# Patient Record
Sex: FEMALE | Race: WHITE
[De-identification: names, ages, dates, MRNs, and addresses within clinical notes are randomized per-mention and may not be internally consistent; named-entity substitution may affect disease eponyms.]

---

## 2020-03-27 ENCOUNTER — HOSPITAL ENCOUNTER (EMERGENCY)
Dept: HOSPITAL 4 - SED | Age: 80
Discharge: HOME | End: 2020-03-27
Payer: COMMERCIAL

## 2020-03-27 VITALS — SYSTOLIC BLOOD PRESSURE: 129 MMHG

## 2020-03-27 VITALS — WEIGHT: 140 LBS | BODY MASS INDEX: 23.32 KG/M2 | HEIGHT: 65 IN

## 2020-03-27 VITALS — SYSTOLIC BLOOD PRESSURE: 125 MMHG

## 2020-03-27 DIAGNOSIS — Z79.899: ICD-10-CM

## 2020-03-27 DIAGNOSIS — M25.561: ICD-10-CM

## 2020-03-27 DIAGNOSIS — Z00.00: ICD-10-CM

## 2020-03-27 DIAGNOSIS — M25.562: Primary | ICD-10-CM

## 2020-03-27 LAB
ALBUMIN SERPL BCP-MCNC: 2.8 G/DL (ref 3.4–4.8)
ALT SERPL W P-5'-P-CCNC: 17 U/L (ref 12–78)
ANION GAP SERPL CALCULATED.3IONS-SCNC: 7 MMOL/L (ref 5–15)
APPEARANCE UR: CLEAR
AST SERPL W P-5'-P-CCNC: 13 U/L (ref 10–37)
BASOPHILS # BLD AUTO: 0 K/UL (ref 0–0.2)
BASOPHILS NFR BLD AUTO: 0.4 % (ref 0–2)
BILIRUB SERPL-MCNC: 0.2 MG/DL (ref 0–1)
BILIRUB UR QL STRIP: NEGATIVE
BUN SERPL-MCNC: 19 MG/DL (ref 8–21)
CALCIUM SERPL-MCNC: 8.6 MG/DL (ref 8.4–11)
CHLORIDE SERPL-SCNC: 105 MMOL/L (ref 98–107)
COLOR UR: YELLOW
CREAT SERPL-MCNC: 0.94 MG/DL (ref 0.55–1.3)
EOSINOPHIL # BLD AUTO: 0 K/UL (ref 0–0.4)
EOSINOPHIL NFR BLD AUTO: 0.8 % (ref 0–4)
ERYTHROCYTE [DISTWIDTH] IN BLOOD BY AUTOMATED COUNT: 15.4 % (ref 9–15)
GFR SERPL CREATININE-BSD FRML MDRD: (no result) ML/MIN (ref 90–?)
GLUCOSE SERPL-MCNC: 79 MG/DL (ref 70–99)
GLUCOSE UR STRIP-MCNC: NEGATIVE MG/DL
HCT VFR BLD AUTO: 31.2 % (ref 36–48)
HGB BLD-MCNC: 10.2 G/DL (ref 12–16)
HGB UR QL STRIP: NEGATIVE
INR PPP: 1 (ref 0.8–1.2)
KETONES UR STRIP-MCNC: NEGATIVE MG/DL
LEUKOCYTE ESTERASE UR QL STRIP: NEGATIVE
LYMPHOCYTES # BLD AUTO: 1.3 K/UL (ref 1–5.5)
LYMPHOCYTES NFR BLD AUTO: 22.3 % (ref 20.5–51.5)
MCH RBC QN AUTO: 29 PG (ref 27–31)
MCHC RBC AUTO-ENTMCNC: 33 % (ref 32–36)
MCV RBC AUTO: 87 FL (ref 79–98)
MONOCYTES # BLD MANUAL: 0.5 K/UL (ref 0–1)
MONOCYTES # BLD MANUAL: 9.4 % (ref 1.7–9.3)
NEUTROPHILS # BLD AUTO: 3.9 K/UL (ref 1.8–7.7)
NEUTROPHILS NFR BLD AUTO: 67.1 % (ref 40–70)
NITRITE UR QL STRIP: NEGATIVE
PH UR STRIP: 7 [PH] (ref 5–8)
PLATELET # BLD AUTO: 281 K/UL (ref 130–430)
POTASSIUM SERPL-SCNC: 4.5 MMOL/L (ref 3.5–5.1)
PROT UR QL STRIP: NEGATIVE
PROTHROMBIN TIME: 10.4 SECS (ref 9.5–12.5)
RBC # BLD AUTO: 3.58 MIL/UL (ref 4.2–6.2)
SODIUM SERPLBLD-SCNC: 139 MMOL/L (ref 136–145)
SP GR UR STRIP: 1.01 (ref 1–1.03)
UROBILINOGEN UR STRIP-MCNC: 0.2 MG/DL (ref 0.2–1)
WBC # BLD AUTO: 5.8 K/UL (ref 4.8–10.8)

## 2020-03-27 PROCEDURE — 87040 BLOOD CULTURE FOR BACTERIA: CPT

## 2020-03-27 PROCEDURE — 83605 ASSAY OF LACTIC ACID: CPT

## 2020-03-27 PROCEDURE — 71045 X-RAY EXAM CHEST 1 VIEW: CPT

## 2020-03-27 PROCEDURE — 84484 ASSAY OF TROPONIN QUANT: CPT

## 2020-03-27 PROCEDURE — 81003 URINALYSIS AUTO W/O SCOPE: CPT

## 2020-03-27 PROCEDURE — 85730 THROMBOPLASTIN TIME PARTIAL: CPT

## 2020-03-27 PROCEDURE — 80053 COMPREHEN METABOLIC PANEL: CPT

## 2020-03-27 PROCEDURE — 99285 EMERGENCY DEPT VISIT HI MDM: CPT

## 2020-03-27 PROCEDURE — 85025 COMPLETE CBC W/AUTO DIFF WBC: CPT

## 2020-03-27 PROCEDURE — 87086 URINE CULTURE/COLONY COUNT: CPT

## 2020-03-27 PROCEDURE — 86710 INFLUENZA VIRUS ANTIBODY: CPT

## 2020-03-27 PROCEDURE — 36415 COLL VENOUS BLD VENIPUNCTURE: CPT

## 2020-03-27 PROCEDURE — 85610 PROTHROMBIN TIME: CPT

## 2020-03-27 PROCEDURE — 93005 ELECTROCARDIOGRAM TRACING: CPT

## 2020-05-11 ENCOUNTER — HOSPITAL ENCOUNTER (INPATIENT)
Dept: HOSPITAL 4 - SED | Age: 80
LOS: 7 days | Discharge: NURSING, REHAB OR LONG-TERM CARE | DRG: 603 | End: 2020-05-18
Payer: COMMERCIAL

## 2020-05-11 VITALS — SYSTOLIC BLOOD PRESSURE: 115 MMHG

## 2020-05-11 VITALS — SYSTOLIC BLOOD PRESSURE: 123 MMHG | BODY MASS INDEX: 30.05 KG/M2 | WEIGHT: 153.06 LBS | HEIGHT: 60 IN

## 2020-05-11 VITALS — SYSTOLIC BLOOD PRESSURE: 141 MMHG

## 2020-05-11 DIAGNOSIS — I87.8: ICD-10-CM

## 2020-05-11 DIAGNOSIS — F32.9: ICD-10-CM

## 2020-05-11 DIAGNOSIS — I25.10: ICD-10-CM

## 2020-05-11 DIAGNOSIS — E44.1: ICD-10-CM

## 2020-05-11 DIAGNOSIS — F41.9: ICD-10-CM

## 2020-05-11 DIAGNOSIS — L03.116: Primary | ICD-10-CM

## 2020-05-11 DIAGNOSIS — D64.9: ICD-10-CM

## 2020-05-11 DIAGNOSIS — M19.071: ICD-10-CM

## 2020-05-11 DIAGNOSIS — Z79.899: ICD-10-CM

## 2020-05-11 DIAGNOSIS — I13.0: ICD-10-CM

## 2020-05-11 DIAGNOSIS — K92.2: ICD-10-CM

## 2020-05-11 DIAGNOSIS — Z90.710: ICD-10-CM

## 2020-05-11 DIAGNOSIS — J44.9: ICD-10-CM

## 2020-05-11 DIAGNOSIS — N18.9: ICD-10-CM

## 2020-05-11 DIAGNOSIS — Z20.828: ICD-10-CM

## 2020-05-11 DIAGNOSIS — K21.9: ICD-10-CM

## 2020-05-11 DIAGNOSIS — M19.072: ICD-10-CM

## 2020-05-11 DIAGNOSIS — F03.90: ICD-10-CM

## 2020-05-11 DIAGNOSIS — L03.115: ICD-10-CM

## 2020-05-11 DIAGNOSIS — E78.5: ICD-10-CM

## 2020-05-11 DIAGNOSIS — I50.9: ICD-10-CM

## 2020-05-11 LAB
ALBUMIN SERPL BCP-MCNC: 2.9 G/DL (ref 3.4–4.8)
ALT SERPL W P-5'-P-CCNC: 15 U/L (ref 12–78)
ANION GAP SERPL CALCULATED.3IONS-SCNC: 6 MMOL/L (ref 5–15)
AST SERPL W P-5'-P-CCNC: 17 U/L (ref 10–37)
BASOPHILS # BLD AUTO: 0 K/UL (ref 0–0.2)
BASOPHILS NFR BLD AUTO: 0.4 % (ref 0–2)
BILIRUB SERPL-MCNC: 0.2 MG/DL (ref 0–1)
BUN SERPL-MCNC: 17 MG/DL (ref 8–21)
CALCIUM SERPL-MCNC: 8 MG/DL (ref 8.4–11)
CHLORIDE SERPL-SCNC: 106 MMOL/L (ref 98–107)
CREAT SERPL-MCNC: 0.94 MG/DL (ref 0.55–1.3)
EOSINOPHIL # BLD AUTO: 0.1 K/UL (ref 0–0.4)
EOSINOPHIL NFR BLD AUTO: 1.2 % (ref 0–4)
ERYTHROCYTE [DISTWIDTH] IN BLOOD BY AUTOMATED COUNT: 15 % (ref 9–15)
GFR SERPL CREATININE-BSD FRML MDRD: (no result) ML/MIN (ref 90–?)
GLUCOSE SERPL-MCNC: 100 MG/DL (ref 70–99)
HCT VFR BLD AUTO: 25.9 % (ref 36–48)
HGB BLD-MCNC: 8.6 G/DL (ref 12–16)
LYMPHOCYTES # BLD AUTO: 1.4 K/UL (ref 1–5.5)
LYMPHOCYTES NFR BLD AUTO: 17.2 % (ref 20.5–51.5)
MCH RBC QN AUTO: 29 PG (ref 27–31)
MCHC RBC AUTO-ENTMCNC: 33 % (ref 32–36)
MCV RBC AUTO: 87 FL (ref 79–98)
MONOCYTES # BLD MANUAL: 0.6 K/UL (ref 0–1)
MONOCYTES # BLD MANUAL: 7.4 % (ref 1.7–9.3)
NEUTROPHILS # BLD AUTO: 6.1 K/UL (ref 1.8–7.7)
NEUTROPHILS NFR BLD AUTO: 73.8 % (ref 40–70)
PLATELET # BLD AUTO: 242 K/UL (ref 130–430)
POTASSIUM SERPL-SCNC: 3.9 MMOL/L (ref 3.5–5.1)
RBC # BLD AUTO: 2.99 MIL/UL (ref 4.2–6.2)
SODIUM SERPLBLD-SCNC: 139 MMOL/L (ref 136–145)
WBC # BLD AUTO: 8.3 K/UL (ref 4.8–10.8)

## 2020-05-11 PROCEDURE — U0003 INFECTIOUS AGENT DETECTION BY NUCLEIC ACID (DNA OR RNA); SEVERE ACUTE RESPIRATORY SYNDROME CORONAVIRUS 2 (SARS-COV-2) (CORONAVIRUS DISEASE [COVID-19]), AMPLIFIED PROBE TECHNIQUE, MAKING USE OF HIGH THROUGHPUT TECHNOLOGIES AS DESCRIBED BY CMS-2020-01-R: HCPCS

## 2020-05-11 PROCEDURE — C1751 CATH, INF, PER/CENT/MIDLINE: HCPCS

## 2020-05-11 RX ADMIN — HYDROCODONE BITARTRATE AND ACETAMINOPHEN PRN TAB: 10; 325 TABLET ORAL at 22:27

## 2020-05-11 NOTE — NUR
Patient will be admitted to care of MD.  Admitted to M/S unit.  Will go to room 
111A.  Belongings list completed.  Complete and up to date summary report 
printed. SBAR report to be given at bedside with opportunity for questions.

## 2020-05-11 NOTE — NUR
Opening notes

Patient is up in bed finishing dinner at this time. Breathing is even and unlabored. No 
signs of distress noted. 20G to right forearm is patent and saline locked. Patient has no 
other needs at this time. Bed is locked in the lowest position with call light within reach. 
Side rails up X 3. Bed alarm is on.

## 2020-05-11 NOTE — NUR
Spoke to MD Villalpando

Spoke to Dr. Villalpando and updated him on patient's current pain status. New orders given for 
pain medication, RN verified orders and is to input. MD to come in at a later time to assess 
and complete med rec.

## 2020-05-11 NOTE — NUR
RN Rounds

Patient is resting in bed at this time. Breathing is even and unlabored. No signs of 
distress noted. Patient has complaints of pain to bilateral lower extremities and right 
shoulder, will page MD. Vital signs are stable. No other needs at this time. Bed is locked 
in the lowest position with call light within reach. Side rails up X 3. Bed alarm is on.

## 2020-05-11 NOTE — NUR
Medication reconciliation completed with information provided by [facility]. Any prior medication reconciliation on file was reviewed and corrected.

## 2020-05-11 NOTE — NUR
ADMISSION NOTE

Received patient from ER via sydni, received report from Earnest NAVA. Patient admitted with 
diagnosis of Cellulitis. Patient oriented to hospital routine, call light, toileting and 
safety-patient verbalized understanding.

## 2020-05-11 NOTE — NUR
Medication

Administered PRN pain medication. Educated patient on action and side effects, patient 
verbalized an understanding. No other needs at this time. Bed is locked in the lowest 
position with call light within reach. Bed alarm is on with side rails up X 3.

## 2020-05-11 NOTE — NUR
NOTES- IN BED, AWAKE FORGETFUL. SPEAK Chinese, UNDERSTAND SIMPLE ENGLISH. DENIES ANY PAIN OR 
DISCOMFORT. REDNESS ON BILATERAL LOWER EXTREMITIES. ORIENTED TO ENVIRONMENT AND CALL LIGHT 
USE. BED ALARM ON. WILL MONITOR.

## 2020-05-11 NOTE — NUR
ESTER TO ASSUEM CARE. PT CALM, ALERT, RESP UNLABORED, SKIN WARM AND DRY. Maltese 
SPEAKING IN FULL COMPLETE SENTENCES. DENIES PAIN.

BLE 2+ PTTING EDEMA. NO DYSPNEA, NSR ON MONITOR NO ECTOPY

## 2020-05-11 NOTE — NUR
closing notes- In bed, trying to get some sleep. does not want to eat, had sandwich earlier. 
no acute distress noted. will endorse to night nurse to take picture in the lower 
extremities.

## 2020-05-12 VITALS — SYSTOLIC BLOOD PRESSURE: 148 MMHG

## 2020-05-12 VITALS — SYSTOLIC BLOOD PRESSURE: 129 MMHG

## 2020-05-12 VITALS — SYSTOLIC BLOOD PRESSURE: 136 MMHG

## 2020-05-12 VITALS — SYSTOLIC BLOOD PRESSURE: 101 MMHG

## 2020-05-12 RX ADMIN — MEMANTINE HYDROCHLORIDE SCH MG: 5 TABLET ORAL at 20:43

## 2020-05-12 RX ADMIN — MEMANTINE HYDROCHLORIDE SCH MG: 5 TABLET ORAL at 09:54

## 2020-05-12 RX ADMIN — Medication SCH MG: at 09:55

## 2020-05-12 RX ADMIN — Medication SCH TAB: at 09:54

## 2020-05-12 RX ADMIN — MUPIROCIN SCH GM: 20 OINTMENT TOPICAL at 20:44

## 2020-05-12 RX ADMIN — DOCUSATE SODIUM SCH MG: 100 CAPSULE, LIQUID FILLED ORAL at 09:53

## 2020-05-12 RX ADMIN — Medication SCH MLS/HR: at 13:51

## 2020-05-12 RX ADMIN — HYDROCODONE BITARTRATE AND ACETAMINOPHEN PRN TAB: 10; 325 TABLET ORAL at 22:01

## 2020-05-12 RX ADMIN — Medication SCH MCG: at 09:53

## 2020-05-12 RX ADMIN — GABAPENTIN SCH MG: 100 CAPSULE ORAL at 15:17

## 2020-05-12 RX ADMIN — GABAPENTIN SCH MG: 100 CAPSULE ORAL at 20:44

## 2020-05-12 RX ADMIN — MELOXICAM SCH MG: 7.5 TABLET ORAL at 09:53

## 2020-05-12 RX ADMIN — Medication SCH MG: at 09:54

## 2020-05-12 RX ADMIN — DONEPEZIL HYDROCHLORIDE SCH MG: 5 TABLET, FILM COATED ORAL at 09:55

## 2020-05-12 RX ADMIN — GABAPENTIN SCH MG: 100 CAPSULE ORAL at 09:55

## 2020-05-12 RX ADMIN — CITALOPRAM SCH MG: 20 TABLET, FILM COATED ORAL at 09:55

## 2020-05-12 RX ADMIN — HYDROCODONE BITARTRATE AND ACETAMINOPHEN PRN TAB: 10; 325 TABLET ORAL at 04:54

## 2020-05-12 RX ADMIN — DOCUSATE SODIUM SCH MG: 100 CAPSULE, LIQUID FILLED ORAL at 20:43

## 2020-05-12 NOTE — NUR
OPENING NOTES

PT AWAKE, ALERT, AND ORIENTED TO PERSON TIME AND PLACE AT THIS TIME. CONFUSED IN 
CONVERSATION, CONTINUES TO MENTION SHOES. NONLABORED BREATHING NOTED ON ROOM AIR, O2 AT 98%. 
PT DENIES PAIN AND SOB AT THIS TIME. PT PULLED OUT IV LINE, REFUSES TO HAVE ONE INSERTED AT 
THIS TIME, WILL ATTEMPT LATER. NO ACUTE DISTRESS NOTED. BED LOCKED AND IN LOWEST POSITION. 
ALL NEEDS MET. CALL LIGHT IN REACH. FALL AND ASPIRATION PRECAUTIONS IN PLACE. CONTINUE TO 
MONITOR.

## 2020-05-12 NOTE — NUR
Obtained Consent

spoke to patient's daughter, Ayaka Jean, over the phone and obtained consent for midline 
placement. Charge nurse BRANDY Benson verified consent.

## 2020-05-12 NOTE — NUR
RN Rounds

Patient is now resting in bed. Breathing is even and unlabored. No signs of distress noted. 
Bed is locked in the lowest position with call light within reach. Bed alarm is on with side 
rails up X3

## 2020-05-12 NOTE — NUR
ROUTINE MEDS

ROUTINE MEDS ADMINISTERED AS ORDERED PER MD, EDUCATION GIVEN, TOLERATED WILL. CONTINUE TO 
MONITOR.

## 2020-05-12 NOTE — NUR
IV RE-INSERTION:

PT REMOVED IV THIS AM. PT GIVING CONSENT TO START IV AT THIS TIME. SUCCESSFUL AFTER 4 
ATTEMPTS. TWO ATTEMPTS FROM BRANDY CARRASCO. TWO ATTEMPTS FROM BRANDY AUSTIN. STARTED PT ON VANCO. 
WILL OBSERVE FOR ANY SIGNS OF INFILTRATION.

## 2020-05-12 NOTE — NUR
PT PULLED OUT IV. PT REFUSING TO HAVE IV LINE RE-INSERTED. MD AWARE. MIDLINE PLACEMENT ORDER 
RECEIVED. CALLED PT'S CHILD SAMANTHA FRANCO TWICE, SAMANTHA NOT ANSWERING, LEFT VOICEMAIL FOR 
CALL BACK.

## 2020-05-12 NOTE — NUR
CLOSING NOTES

PT AWAKE AND SITTING UP IN BED. NONLABORED BREATHING NOTED ON ROOM AIR. PT DENIES PAIN AND 
SOB AT THIS TIME. NO ACUTE DISTRESS NOTED. BED LOCKED AND IN LOWEST POSITION. ALL NEEDS MET. 
CALL LIGHT IN REACH. FALL AND ASPIRATION PRECAUTIONS IN PLACE. WILL ENDORSE TO NOC NURSE.

## 2020-05-12 NOTE — NUR
Left Message for PICC line nurse

Left message for Rodney informing him we obtained consent for midline placement, and if he 
could come and place midline before 11AM tomorrow.

## 2020-05-12 NOTE — NUR
Opening notes

Patient is up out of bed at this time. Breathing is even and unlabored. No signs of distress 
noted. Patient is alert, but not oriented to time or place. She is steady on her feet, 
requires frequent reorientation. Patient does not currently have IV access and MD is aware, 
waiting to get consent for midline placement. No needs at this time. Bed is locked in the 
lowest position with call light within reach. Side rails up X 3.

## 2020-05-12 NOTE — NUR
Nutrition Update



Micheal Scale 18 noted.

Pt admitted for Cellulitis

Diet: Regular

BMI: 28.5 kg/m2

RD to follow per nutrition care standards.

## 2020-05-12 NOTE — NUR
Medication

Administered scheduled  medication. Educated patient on action and side effects, patient 
verbalized an understanding. Patient likes to be standing by door, place chair next to door, 
patient is no sitting and is visible to nurses station. No other needs at this time. Bed is 
locked in the lowest position with call light within reach.

## 2020-05-12 NOTE — NUR
Closing notes

Patient is in bed resting.  Breathing is even and unlabored. No signs of distress noted. 20G 
to right forearm is patent and saline locked. Patient uses BSC to void, patient is steady on 
her feet, standby asisst. Bed is locked in the lowest position with call light within reach. 
Side rails up X 3. Bed alarm is on. All needs met throughout shift. Will endorse care to 
dayshift RN

## 2020-05-12 NOTE — NUR
RN Rounds

Patient is resting in bed with eyes closed. Breathing is even and unlabored. No signs of 
distress noted. Patient does not have any needs at this time.   Bed is locked in the lowest 
position with call light within reach. Bed alarm is on with side rails up X 3.

## 2020-05-13 VITALS — SYSTOLIC BLOOD PRESSURE: 118 MMHG

## 2020-05-13 VITALS — SYSTOLIC BLOOD PRESSURE: 110 MMHG

## 2020-05-13 VITALS — SYSTOLIC BLOOD PRESSURE: 120 MMHG

## 2020-05-13 VITALS — SYSTOLIC BLOOD PRESSURE: 136 MMHG

## 2020-05-13 VITALS — SYSTOLIC BLOOD PRESSURE: 138 MMHG

## 2020-05-13 LAB
ALBUMIN SERPL BCP-MCNC: 3.2 G/DL (ref 3.4–4.8)
ALT SERPL W P-5'-P-CCNC: 15 U/L (ref 12–78)
ANION GAP SERPL CALCULATED.3IONS-SCNC: 4 MMOL/L (ref 5–15)
AST SERPL W P-5'-P-CCNC: 14 U/L (ref 10–37)
BASOPHILS # BLD AUTO: 0 K/UL (ref 0–0.2)
BASOPHILS NFR BLD AUTO: 0.6 % (ref 0–2)
BILIRUB SERPL-MCNC: 0.4 MG/DL (ref 0–1)
BUN SERPL-MCNC: 29 MG/DL (ref 8–21)
CALCIUM SERPL-MCNC: 8.2 MG/DL (ref 8.4–11)
CHLORIDE SERPL-SCNC: 105 MMOL/L (ref 98–107)
CREAT SERPL-MCNC: 1.07 MG/DL (ref 0.55–1.3)
EOSINOPHIL # BLD AUTO: 0.1 K/UL (ref 0–0.4)
EOSINOPHIL NFR BLD AUTO: 1.9 % (ref 0–4)
ERYTHROCYTE [DISTWIDTH] IN BLOOD BY AUTOMATED COUNT: 15 % (ref 9–15)
GFR SERPL CREATININE-BSD FRML MDRD: (no result) ML/MIN (ref 90–?)
GLUCOSE SERPL-MCNC: 63 MG/DL (ref 70–99)
HCT VFR BLD AUTO: 26.5 % (ref 36–48)
HGB BLD-MCNC: 8.6 G/DL (ref 12–16)
INR PPP: 1 (ref 0.8–1.2)
LACTATE SERPL-SCNC: 189 U/L (ref 81–234)
LYMPHOCYTES # BLD AUTO: 1.3 K/UL (ref 1–5.5)
LYMPHOCYTES NFR BLD AUTO: 19.3 % (ref 20.5–51.5)
MCH RBC QN AUTO: 28 PG (ref 27–31)
MCHC RBC AUTO-ENTMCNC: 33 % (ref 32–36)
MCV RBC AUTO: 86 FL (ref 79–98)
MONOCYTES # BLD MANUAL: 0.7 K/UL (ref 0–1)
MONOCYTES # BLD MANUAL: 10.4 % (ref 1.7–9.3)
NEUTROPHILS # BLD AUTO: 4.4 K/UL (ref 1.8–7.7)
NEUTROPHILS NFR BLD AUTO: 67.8 % (ref 40–70)
PLATELET # BLD AUTO: 247 K/UL (ref 130–430)
POTASSIUM SERPL-SCNC: 4 MMOL/L (ref 3.5–5.1)
PROTHROMBIN TIME: 10.5 SECS (ref 9.5–12.5)
RBC # BLD AUTO: 3.06 MIL/UL (ref 4.2–6.2)
RETICS #: 1.7 % (ref 0.5–1.5)
SODIUM SERPLBLD-SCNC: 138 MMOL/L (ref 136–145)
TIBC SERPL-MCNC: 243 UG/DL (ref 250–450)
TSH SERPL DL<=0.05 MIU/L-ACNC: 1.5 UIU/ML (ref 0.34–4.82)
WBC # BLD AUTO: 6.6 K/UL (ref 4.8–10.8)

## 2020-05-13 PROCEDURE — B54MZZA ULTRASONOGRAPHY OF RIGHT UPPER EXTREMITY VEINS, GUIDANCE: ICD-10-PCS

## 2020-05-13 PROCEDURE — 05HY33Z INSERTION OF INFUSION DEVICE INTO UPPER VEIN, PERCUTANEOUS APPROACH: ICD-10-PCS

## 2020-05-13 RX ADMIN — GABAPENTIN SCH MG: 100 CAPSULE ORAL at 21:51

## 2020-05-13 RX ADMIN — GABAPENTIN SCH MG: 100 CAPSULE ORAL at 08:31

## 2020-05-13 RX ADMIN — CITALOPRAM SCH MG: 20 TABLET, FILM COATED ORAL at 08:29

## 2020-05-13 RX ADMIN — Medication SCH MG: at 08:31

## 2020-05-13 RX ADMIN — MELOXICAM SCH MG: 7.5 TABLET ORAL at 09:08

## 2020-05-13 RX ADMIN — Medication SCH MLS/HR: at 11:53

## 2020-05-13 RX ADMIN — MEMANTINE HYDROCHLORIDE SCH MG: 5 TABLET ORAL at 21:51

## 2020-05-13 RX ADMIN — GABAPENTIN SCH MG: 100 CAPSULE ORAL at 14:43

## 2020-05-13 RX ADMIN — DOCUSATE SODIUM SCH MG: 100 CAPSULE, LIQUID FILLED ORAL at 08:31

## 2020-05-13 RX ADMIN — Medication SCH MG: at 08:30

## 2020-05-13 RX ADMIN — DOCUSATE SODIUM SCH MG: 100 CAPSULE, LIQUID FILLED ORAL at 21:50

## 2020-05-13 RX ADMIN — Medication SCH MCG: at 08:31

## 2020-05-13 RX ADMIN — MUPIROCIN SCH GM: 20 OINTMENT TOPICAL at 08:32

## 2020-05-13 RX ADMIN — DONEPEZIL HYDROCHLORIDE SCH MG: 5 TABLET, FILM COATED ORAL at 08:31

## 2020-05-13 RX ADMIN — HYDROCODONE BITARTRATE AND ACETAMINOPHEN PRN TAB: 10; 325 TABLET ORAL at 21:50

## 2020-05-13 RX ADMIN — MEMANTINE HYDROCHLORIDE SCH MG: 5 TABLET ORAL at 08:30

## 2020-05-13 RX ADMIN — MUPIROCIN SCH GM: 20 OINTMENT TOPICAL at 21:50

## 2020-05-13 RX ADMIN — Medication SCH TAB: at 08:27

## 2020-05-13 NOTE — NUR
RN Rounds

Patient is resting in bed with eyes closed.  Breathing is even and unlabored. No signs of 
distress noted.  Bed is locked in the lowest position with call light within reach. Bed 
alarm is on with side rails up X3

## 2020-05-13 NOTE — NUR
Closing notes

Patient is in bed resting.  Breathing is even and unlabored. No signs of distress noted.  
Patient uses BSC or bathroom to void, patient is steady on her feet, standby assist. Occult 
stool sample was not collected, patient did not have BM during shift. Bed is locked in the 
lowest position with call light within reach. Side rails up X 3. Bed alarm is on. All needs 
met throughout shift. Will endorse care to dayshift RN

## 2020-05-13 NOTE — NUR
RN Rounds

Patient is now resting in bed.  Breathing is even and unlabored. No signs of distress noted. 
At times does get out of bed and has to be reoriented back into bed. Bed is locked in the 
lowest position with call light within reach. Bed alarm is on with side rails up X3

## 2020-05-13 NOTE — NUR
ROUNDS

Asleep in bed. No sign of distress. Safety checks done. Call light within reach. Will 
continue to monitor.

## 2020-05-13 NOTE — NUR
pt.assessed.v/s assessed;values w/in normal limits.pt.presents language barrier extant;pt's 
sole language;Czech.i have apprised the pt.that snacks/beverages are available w/in the 
shift.dinner tray left for pt.pt.aetding to dinner tray.no requests posited@this hour.no c/o 
pain,nausea.pt.c/o cold.i haver provided blanket;warmed.i provided socks.i have noted the 

status of the lower extremities;edema,erythema,mottled;2/t cellulitis.pt.

capable to reposition self.call light/telephone placed w/in reach of the pt.

## 2020-05-13 NOTE — NUR
SS NOTES/DCP:



MSW was referred by CM to see patient for DCP. Pt is Persian speaker only, collateral done 
with grand-daughter Ayaka Ospina @ [phone] instead.



Pt is a resident at [facility] and has been there for 1 month now. Prior to 
admission at SNF, pt was staying with daughter and grand-daughter but patient needed more 
care prompting them to admit pt to SNF after a hospitalization. Pt is dependent on all her 
ADLs. Per grand-dtr, pt is ambulatory at times but sometimes refuses to walk. Pt also has 
Alzheimer/dementia for 3 years now. Per grand-daughter, pt's NOK, Ayaka Ospina (dtr) is 
responsible for patient's finances but Doni Steiner (son) is the patient's DPOA (phone # 
unknown). Pt's source of income is her disability. When discharge, family prefers for 
patient to go back to [facility], as this is her permanent residence now. MSW provided 
grand-dtr with phone number and encourage to contact SS if questions arise. No further SS 
needs identified but will remain available when needed.

## 2020-05-13 NOTE — NUR
ALERT, ORIENTED, AND APPROPRIATE, MINIMAL ASSISTANCE FROM BED TO BSC, WALKED WITH A WALKER, 
SLOW BUT STEADY.  ON FULL LIQUID DIET, TOLERATED WELL, DENIED ABDOMINAL PAIN AT THIS TIME

## 2020-05-13 NOTE — NUR
2100pmedication administered.pt.capable to ingest po medication whole 

w/out difficulty.no requests posited @this hour.

## 2020-05-13 NOTE — NUR
Assumed care for patient. Resting in bed.  Slovak speaking only. No shortness of breath on 
room air. Denies any pain. Oriented to name only. Thinks that her room is her house and is 
not aware of time. Midline in right upper arm intact. Fall and safety checks in place. Call 
light within reach. Will continue to monitor.

## 2020-05-13 NOTE — NUR
pt.assessed.pt.had requested medication;pain.i have administered norco;10/325mg po.to 
re-assess the pain medication efficacy per pain mgx protocol.

pt.had requested additional blanket.i have provided the blanket;warmed.

call light/telephone placed w/in reach of the pt.

## 2020-05-13 NOTE — NUR
change of shift.pt.presents isolation status;contact;mrsa;nares+.pt.presents quiescent 
affect;calm,resting.pt.presents rt.bicept;mid-line.placement;05/13/20.iv access lock.

general status stable.respiratory status stable;unlabored.call light/telephone 

w/in reach of the pt.

## 2020-05-13 NOTE — NUR
ROUNDS

Resting in bed. No sign of distress. Midline IV intact. Administered antibiotics. Ambulates 
to the restroom independently. Safety checks done. Will continue to monitor.

## 2020-05-13 NOTE — NUR
ROUNDS

Resting in bed. No sign of distress. Ate most of her lunch. Safety checks done. Call light 
within reach. Will continue to monitor.

## 2020-05-14 VITALS — SYSTOLIC BLOOD PRESSURE: 113 MMHG

## 2020-05-14 VITALS — SYSTOLIC BLOOD PRESSURE: 121 MMHG

## 2020-05-14 VITALS — SYSTOLIC BLOOD PRESSURE: 141 MMHG

## 2020-05-14 VITALS — SYSTOLIC BLOOD PRESSURE: 101 MMHG

## 2020-05-14 VITALS — SYSTOLIC BLOOD PRESSURE: 109 MMHG

## 2020-05-14 LAB
ANION GAP SERPL CALCULATED.3IONS-SCNC: 4 MMOL/L (ref 5–15)
BUN SERPL-MCNC: 20 MG/DL (ref 8–21)
CALCIUM SERPL-MCNC: 7.9 MG/DL (ref 8.4–11)
CHLORIDE SERPL-SCNC: 103 MMOL/L (ref 98–107)
CREAT SERPL-MCNC: 0.91 MG/DL (ref 0.55–1.3)
FERRITIN: 61 NG/ML (ref 15–150)
FOLATE (FOLIC ACID): 13.1 NG/ML (ref 3–?)
GFR SERPL CREATININE-BSD FRML MDRD: (no result) ML/MIN (ref 90–?)
GLUCOSE SERPL-MCNC: 81 MG/DL (ref 70–99)
POTASSIUM SERPL-SCNC: 4.1 MMOL/L (ref 3.5–5.1)
SODIUM SERPLBLD-SCNC: 137 MMOL/L (ref 136–145)
VANCOMYCIN TROUGH SERPL-MCNC: 3.5 UG/ML (ref 5–10)
VIT B12 SERPL-MCNC: 1178 PG/ML (ref 232–1245)

## 2020-05-14 RX ADMIN — MEMANTINE HYDROCHLORIDE SCH MG: 5 TABLET ORAL at 21:52

## 2020-05-14 RX ADMIN — GABAPENTIN SCH MG: 100 CAPSULE ORAL at 15:04

## 2020-05-14 RX ADMIN — GABAPENTIN SCH MG: 100 CAPSULE ORAL at 09:04

## 2020-05-14 RX ADMIN — Medication SCH MG: at 09:04

## 2020-05-14 RX ADMIN — GABAPENTIN SCH MG: 100 CAPSULE ORAL at 21:52

## 2020-05-14 RX ADMIN — HYDROCODONE BITARTRATE AND ACETAMINOPHEN PRN TAB: 10; 325 TABLET ORAL at 03:20

## 2020-05-14 RX ADMIN — MEMANTINE HYDROCHLORIDE SCH MG: 5 TABLET ORAL at 09:02

## 2020-05-14 RX ADMIN — Medication SCH MCG: at 09:04

## 2020-05-14 RX ADMIN — Medication SCH TAB: at 09:02

## 2020-05-14 RX ADMIN — DONEPEZIL HYDROCHLORIDE SCH MG: 5 TABLET, FILM COATED ORAL at 09:04

## 2020-05-14 RX ADMIN — MELOXICAM SCH MG: 7.5 TABLET ORAL at 09:10

## 2020-05-14 RX ADMIN — MUPIROCIN SCH APPLIC: 20 OINTMENT TOPICAL at 09:05

## 2020-05-14 RX ADMIN — DOCUSATE SODIUM SCH MG: 100 CAPSULE, LIQUID FILLED ORAL at 21:51

## 2020-05-14 RX ADMIN — CITALOPRAM SCH MG: 20 TABLET, FILM COATED ORAL at 09:04

## 2020-05-14 RX ADMIN — DOCUSATE SODIUM SCH MG: 100 CAPSULE, LIQUID FILLED ORAL at 09:04

## 2020-05-14 RX ADMIN — Medication SCH MLS/HR: at 11:55

## 2020-05-14 RX ADMIN — MUPIROCIN SCH APPLIC: 20 OINTMENT TOPICAL at 21:53

## 2020-05-14 NOTE — NUR
Pt A/Ox 4. Speaks primarily Maltese but understands English.  RU Midline SL.  Pt is NPO.  Pt 
is able to ambulate on own.  PERRLA 3mm.  No indications of JVD edema bilaterally ACE 
bandage in place.  Bed locked in lowest position with call light in place.  Pt is on contact 
precautions.

## 2020-05-14 NOTE — NUR
pt.assessed.pt.presents quiescent affect;calm,somnolent.pt.assessed for

cleanliness.pt.repositioned.no c/o pain,nausea.general status stable.

respiratory status stable;unlabored.call light/telephone placed w/in reach 

of the pt.

## 2020-05-14 NOTE — NUR
Opening note



Pt A/Ox 4. Speaks primarily Vincentian but understands English.  RU Midline SL.  Pt is eating 
breakfast with feet dangling.  Pt is able to ambulate on own.  PERRLA 3mm.  No indications 
of JVD or edema.  Bed locked in lowest position with call light in place.  Pt is on contact 
precautions MRSA nares.

## 2020-05-14 NOTE — NUR
pt.assessed.pt.presents quiescent affect;calm,somnolent.pt.assessed 

for cleanliness.pt.repositioned.general status stable.respiratory status 
stable;unlabored.call light/telephone placed w/in reach of the pt.

## 2020-05-14 NOTE — NUR
CONSULTATION PAGED



REASON FOR CONSULTATION:ANEMIA

WAS CONSULT CALLED?Y

PERSON WHO WAS NOTIFIED:SHAQ ZAMBRANO

CONSULTING PHYSICIAN:SHAQ ZAMBRANO

CONSULTANT SPECIALTY:GI

CONSULTANT PHONE NUMBER:[phone]

ORDERING PHYSICIAN:NATE HUNTER

## 2020-05-14 NOTE — NUR
BOUTS OF CONFUSION. ENCOURAGE TO DRINK GOLYTELY. DRANK ONLY VERY LITTLE. LIKES TO WANDER IN 
ROOM AND HALLWAY. REQUIRES FREQUENT INTERVENTIONS AND REMINDER.

## 2020-05-14 NOTE — NUR
pt.assessed.nav garcia/arie present.assessed the pt.i have conveyed the 

pt's pertinent info to the dr's respectively. ordered increase in seroquel 
dose;37.5mg po bid.i conveyed to  the pt's stated pain;lt.foot.

heel/ankle. has ordered xr-foot.pt.assisted to the restroom.pt. assisted return to 
bed.mid-line intact;patent.general status stable.respiratory status stable;unlabored.call 
light/telephone placed w/in reach of the pt.

-------------------------------------------------------------------------------

Addendum: 05/14/20 at 0628 by Yoni Means RN

-------------------------------------------------------------------------------

i have weighed the pt.2/t chf.

## 2020-05-14 NOTE — NUR
pt.assessed.pt.assisted to the restroom.pt.assisted return to bed;gait 
assessed.unsteady.pt.requested diaper;i provided the indication no diaper policy permitted.i 
provided under-pants/feminine napkin.i assisted the pt. 

w/the items.no c/o pain,nausea.

no additional requests posited@this hour.v/s assessed values w/in normal  limits.call 
light./telephone placed w/in reach of the pt.

## 2020-05-14 NOTE — NUR
pt.awakened.pt.c/o itchiness.general status.no medications 

ordered re;itch.;salinas paged re;pt's change i status itch.

## 2020-05-14 NOTE — NUR
pt.had requested medication;pain.i have administered norco;10/325mg po.

to re-assess the medication efficacy per pain mgx protocol.

-------------------------------------------------------------------------------

Addendum: 05/14/20 at 0333 by Yoni Means RN

-------------------------------------------------------------------------------

i have assisted the pt to the restroom.i have assisted th pt's return to bed.

## 2020-05-14 NOTE — NUR
i have assisted the pt.to the restroom.i have assisted the pt's return 

to bed.i have provided the pt.w/blanket;warmed.

## 2020-05-14 NOTE — NUR
Wound Evaluation:



Wound Consult ordered for Low Micheal Score.



Patient evaluated for a low Micheal score of 16. Patient was awake, alert, Maori speaking, 
and received in a [place] Bed with an IsoFlex MARY mattress. Patient is able to turn in bed 
independently. Recommend encourage and assist patient as needed with repositioning every 2 
hours with pillow support. Elevate bilateral lower extremities above the heart with pillows. 
Offload pressure areas with pillows for pressure re-distribution. Perform skin care and 
monitor skin integrity Q shift. Use moisture barrier cream on moisture susceptible areas QID 
and PRN for soiling.





Skin assessment:

1. Left Lower Extremity:

Cellulitis with erythema and 1+ pitting edema, present on admission. Extremity has no calor. 
No odor, no drainage, weeping or wounds present.



2. Right Lower Extremity:

Cellulitis with erythema and 1+ pitting edema, present on admission. Extremity has no calor. 
No odor, no drainage, weeping or wounds present.



Recommend: Elevate bilateral lower extremities above the heart with pillows while in bed. 
Encourage 20-30 ankle pumps q hour while in bed. Encourage ambulation.

## 2020-05-14 NOTE — NUR
Pt BRP, scant smear in underwear no enough for occult sample.  Noted dark brown color no 
blood indicated.  Changed chucks and bedding.

## 2020-05-14 NOTE — NUR
Per Dr Dunne, called daughter to obtain consent for Colonoscopy and Endoscopy.  Consent 
forms are in pt folder.  Possible to obtain consent with PT with .  PT NPO, and 
started Golytely @ 1830. Will endorse to night nurse.

## 2020-05-15 VITALS — SYSTOLIC BLOOD PRESSURE: 149 MMHG

## 2020-05-15 VITALS — SYSTOLIC BLOOD PRESSURE: 121 MMHG

## 2020-05-15 VITALS — SYSTOLIC BLOOD PRESSURE: 135 MMHG

## 2020-05-15 VITALS — SYSTOLIC BLOOD PRESSURE: 123 MMHG

## 2020-05-15 VITALS — SYSTOLIC BLOOD PRESSURE: 117 MMHG

## 2020-05-15 LAB
ALBUMIN SERPL BCP-MCNC: 3 G/DL (ref 3.4–4.8)
ALT SERPL W P-5'-P-CCNC: 15 U/L (ref 12–78)
ANION GAP SERPL CALCULATED.3IONS-SCNC: 5 MMOL/L (ref 5–15)
AST SERPL W P-5'-P-CCNC: 14 U/L (ref 10–37)
BASOPHILS # BLD AUTO: 0 K/UL (ref 0–0.2)
BASOPHILS NFR BLD AUTO: 0.5 % (ref 0–2)
BILIRUB SERPL-MCNC: 0.3 MG/DL (ref 0–1)
BUN SERPL-MCNC: 22 MG/DL (ref 8–21)
CALCIUM SERPL-MCNC: 8.1 MG/DL (ref 8.4–11)
CHLORIDE SERPL-SCNC: 106 MMOL/L (ref 98–107)
CREAT SERPL-MCNC: 0.72 MG/DL (ref 0.55–1.3)
EOSINOPHIL # BLD AUTO: 0.1 K/UL (ref 0–0.4)
EOSINOPHIL NFR BLD AUTO: 1.6 % (ref 0–4)
ERYTHROCYTE [DISTWIDTH] IN BLOOD BY AUTOMATED COUNT: 15.1 % (ref 9–15)
GFR SERPL CREATININE-BSD FRML MDRD: (no result) ML/MIN (ref 90–?)
GLUCOSE SERPL-MCNC: 79 MG/DL (ref 70–99)
HCT VFR BLD AUTO: 24.7 % (ref 36–48)
HGB BLD-MCNC: 8.2 G/DL (ref 12–16)
LYMPHOCYTES # BLD AUTO: 1.1 K/UL (ref 1–5.5)
LYMPHOCYTES NFR BLD AUTO: 15.2 % (ref 20.5–51.5)
MCH RBC QN AUTO: 29 PG (ref 27–31)
MCHC RBC AUTO-ENTMCNC: 33 % (ref 32–36)
MCV RBC AUTO: 86 FL (ref 79–98)
MONOCYTES # BLD MANUAL: 0.6 K/UL (ref 0–1)
MONOCYTES # BLD MANUAL: 8.8 % (ref 1.7–9.3)
NEUTROPHILS # BLD AUTO: 5.4 K/UL (ref 1.8–7.7)
NEUTROPHILS NFR BLD AUTO: 73.9 % (ref 40–70)
PLATELET # BLD AUTO: 258 K/UL (ref 130–430)
POTASSIUM SERPL-SCNC: 4.3 MMOL/L (ref 3.5–5.1)
RBC # BLD AUTO: 2.87 MIL/UL (ref 4.2–6.2)
SODIUM SERPLBLD-SCNC: 138 MMOL/L (ref 136–145)
WBC # BLD AUTO: 7.3 K/UL (ref 4.8–10.8)

## 2020-05-15 RX ADMIN — DOCUSATE SODIUM SCH MG: 100 CAPSULE, LIQUID FILLED ORAL at 09:01

## 2020-05-15 RX ADMIN — DOCUSATE SODIUM SCH MG: 100 CAPSULE, LIQUID FILLED ORAL at 21:38

## 2020-05-15 RX ADMIN — Medication SCH MG: at 09:00

## 2020-05-15 RX ADMIN — Medication SCH MCG: at 09:01

## 2020-05-15 RX ADMIN — MELOXICAM SCH MG: 7.5 TABLET ORAL at 09:01

## 2020-05-15 RX ADMIN — MEMANTINE HYDROCHLORIDE SCH MG: 5 TABLET ORAL at 09:02

## 2020-05-15 RX ADMIN — CITALOPRAM SCH MG: 20 TABLET, FILM COATED ORAL at 09:02

## 2020-05-15 RX ADMIN — MUPIROCIN SCH APPLIC: 20 OINTMENT TOPICAL at 09:02

## 2020-05-15 RX ADMIN — MUPIROCIN SCH APPLIC: 20 OINTMENT TOPICAL at 21:38

## 2020-05-15 RX ADMIN — MEMANTINE HYDROCHLORIDE SCH MG: 5 TABLET ORAL at 21:39

## 2020-05-15 RX ADMIN — Medication SCH TAB: at 09:01

## 2020-05-15 RX ADMIN — GABAPENTIN SCH MG: 100 CAPSULE ORAL at 09:01

## 2020-05-15 RX ADMIN — GABAPENTIN SCH MG: 100 CAPSULE ORAL at 21:39

## 2020-05-15 RX ADMIN — Medication SCH MG: at 09:01

## 2020-05-15 RX ADMIN — DONEPEZIL HYDROCHLORIDE SCH MG: 5 TABLET, FILM COATED ORAL at 09:02

## 2020-05-15 RX ADMIN — GABAPENTIN SCH MG: 100 CAPSULE ORAL at 16:11

## 2020-05-15 NOTE — NUR
Hygiene

assisted patient to bedside commode, patient was walking and voided on herself and the 
floor, called EVS once the urine was cleaned off of the floor, provided cary-care for the 
patient and changed her gown and her linen, assisted the patient back into bed, she 
tolerated well, continuing to monitor her, bed in lowest position, three side rails up, bed 
alarm on, bed close to nursing station, fall, aspiration and isolation precautions in place.

## 2020-05-15 NOTE — NUR
Consent 

spoke with the patient's son, Doni Steiner, regarding attempts to reach family for consent 
and plan of care for the patient, Doni gave consent for patient to have EGD/Colonoscopy. 
Doni Steiner - [phone]

## 2020-05-15 NOTE — NUR
Medication/Dr. Dunne

patient resting in bed, being assessed by Dr. Dunne at bedside, per Dr. Dunne, okay to 
give medications to the patient, per Dr. Dunne please try to call the family again. 
Educated the patient on medications uses and potential side effects, she verbalized 
understanding and tolerated well, bed in lowest position, three side rails up, bed alarm on, 
bed close to nursing station, fall, aspiration and isolation precautions in place, call 
light placed within reach.

## 2020-05-15 NOTE — NUR
GI MD, DR PATRICK WAS CALLED, RE: TO INFORM THAT CONSENTS FOR EGD AND COLONOSCOPY WERE NOT 
OBTAINED FROM FAMILY.  SPOKE TO YOON

## 2020-05-15 NOTE — NUR
RN Rounds

Patient is resting in bed at this time. Breathing is even and unlabored. No signs of 
distress noted. Seem to be understand but speaks only Malay. No s/s of pain. Both legs are 
swollen, ace bondage were wrapped and elevated both legs. Vital signs are stable. No other 
needs at this time. Bed is locked in the lowest position with call light within reach. Side 
rails up X 3. Bed alarm is on.

## 2020-05-15 NOTE — NUR
RN rounds/medication

spoke with Dr. Dunne, the patient can resume a regular diet. IV antibiotics hung and 
infusing well, IV line is patent, no s/s of infiltration, continuing to monitor the patient, 
bed in lowest position, three side rails up, bed alarm on, bed close to nursing station, 
fall, aspiration and isolation precautions in place.

## 2020-05-15 NOTE — NUR
Spoke with Dr. Dunne

regarding plan of care, informed him that multiple attempts were made to reach the patient's 
daughter, no call back at this time, informed Dr. Dunne of conversation with Dr. Villalpando, per 
Dr. Dunne, the patient is okay to transfer back to SNF and follow up with Dr. Dunne as 
outpatient, will inform Dr. Villalpando as well, DC planning in process.

## 2020-05-15 NOTE — NUR
OCCASIONALLY GETS OUT OF BED, AMBULATES TO BATHROOM AND WANDERS IN THE HALLWAY. NEEDS 
INSTRUCTIONS AND REMINDER.

## 2020-05-15 NOTE — NUR
Attempt to call the family

Ayaka Chambers [phone], lines keeps on ringing, no voicemail, will attempt again.

## 2020-05-15 NOTE — NUR
RN rounds

patient resting in bed, eyes closed, breathing is even and unlabored, no signs of distress, 
easy to wake, educated the patient on medication uses and potential side effects, she 
verbalized understanding, patient tolerated well, no other needs at this time, bed in lowest 
position, three side rails up, bed alarm on, call light within reach, fall, aspiration and 
isolation precautions in place.

## 2020-05-15 NOTE — NUR
Dr murcia notified of hgb drop from 11.5 to 8.5. No orders received   Opening note

patient resting in bed, a/ox1-2, reoriented to place, time and event, she verbalized 
understanding, patient is mostly Latvian speaking, changed the patient's linen and gown, 
provided with cary-care, educated her on call light system she verbalized understanding, bed 
in lowest position, three side rails up, bed alarm on, bed close to nursing station, fall, 
aspiration and isolation precautions in place.

## 2020-05-15 NOTE — NUR
Spoke with Dr. Villalpando

no addendum was made on the ankle xray regarding the left ankle, updated MD that patient's 
family did not call back regarding consent for EGD/[place] today and the patient could not do 
the prep/refused to do the colonoscopy prep., per Dr. Villalpando, inquire of Dr. Dunne if the 
patient's EGD/[place] can be done as outpatient and if the patient can transfer to SNF, if 
cleared by GI, the patient can return to SNF, will follow up.

## 2020-05-15 NOTE — NUR
Attempted to call the family

patient's daughter - no answer, the line kept ringing, will attempt again.

## 2020-05-15 NOTE — NUR
Spoke with Dr. Dunne

to inform him that the patient's family has not returned phone calls for consent for 
EGD/Colonoscopy and the patient refused to take Colonoscopy prep - per Dr. Dunne, please 
try to call the family again.

## 2020-05-15 NOTE — NUR
Dr. Santillan call back

informed MD that consent was obtained - per Dr. Santillan, hold off on EGD/[place] for now, please 
obtain a stool OB first - per. Dr. Santillan because the patient may undergo an elective 
procedure (EGD/[place]) please test the patient for COVID, will put the order in and endorse to 
NOC shift nurse.

## 2020-05-15 NOTE — NUR
Attempt to call the family

Ayaka Chambers [phone], lines keeps on ringing, no voicemail, will attempt again.

## 2020-05-15 NOTE — NUR
Discharge Planning:

DCP faxed pt referral to [facility] (f [phone] p [phone]) DCP to follow up.

-------------------------------------------------------------------------------

Addendum: 05/15/20 at 1346 by Corrina Harris DP

-------------------------------------------------------------------------------

DCP followed up with [facility] (f [phone] p [phone]) Kit from admissions just 
stepped out, DCP will follow up.

-------------------------------------------------------------------------------

Addendum: 05/15/20 at 1458 by Corrina Harris DP

-------------------------------------------------------------------------------

CHRISTIE spoke to Kit in admission at [facility] (f [phone] p [phone]) he is looking 
thru fax for referral.DCP will follow up

## 2020-05-15 NOTE — NUR
Attempt to call the family

Ayaka Chambers [phone], lines keeps on ringing, no voicemail, will attempt again.

## 2020-05-15 NOTE — NUR
RN rounds

patient resting in bed, awake, denies pain, assisted patient to bedside commode, patient 
voided, assisted back into bed, no other needs at this time, bed in lowest position, three 
side rails up, bed alarm on, bed close to nursing station, call light placed within reach, 
fall, aspiration and isolation precautions in place.

## 2020-05-15 NOTE — NUR
Closing note/paged Dr. Dunne

assisted patient to bedside commode, she voided, tolerated well, IV line is intact, all 
needs met, will endorse report to [facility] shift nurse, bed in lowest position, three side rails 
up, call light within reach, fall, aspiration and isolation precautions in place. Paged Dr. Dunne regarding consent for EGD/[place].

## 2020-05-16 VITALS — SYSTOLIC BLOOD PRESSURE: 124 MMHG

## 2020-05-16 VITALS — SYSTOLIC BLOOD PRESSURE: 115 MMHG

## 2020-05-16 VITALS — SYSTOLIC BLOOD PRESSURE: 112 MMHG

## 2020-05-16 VITALS — SYSTOLIC BLOOD PRESSURE: 127 MMHG

## 2020-05-16 VITALS — SYSTOLIC BLOOD PRESSURE: 114 MMHG

## 2020-05-16 RX ADMIN — CITALOPRAM SCH MG: 20 TABLET, FILM COATED ORAL at 09:04

## 2020-05-16 RX ADMIN — MEMANTINE HYDROCHLORIDE SCH MG: 5 TABLET ORAL at 09:04

## 2020-05-16 RX ADMIN — Medication SCH MLS/HR: at 21:54

## 2020-05-16 RX ADMIN — DOCUSATE SODIUM SCH MG: 100 CAPSULE, LIQUID FILLED ORAL at 09:05

## 2020-05-16 RX ADMIN — Medication SCH MLS/HR: at 15:07

## 2020-05-16 RX ADMIN — MUPIROCIN SCH APPLIC: 20 OINTMENT TOPICAL at 09:03

## 2020-05-16 RX ADMIN — DONEPEZIL HYDROCHLORIDE SCH MG: 5 TABLET, FILM COATED ORAL at 09:07

## 2020-05-16 RX ADMIN — GABAPENTIN SCH MG: 100 CAPSULE ORAL at 15:07

## 2020-05-16 RX ADMIN — Medication SCH MCG: at 09:07

## 2020-05-16 RX ADMIN — MEMANTINE HYDROCHLORIDE SCH MG: 5 TABLET ORAL at 21:53

## 2020-05-16 RX ADMIN — GABAPENTIN SCH MG: 100 CAPSULE ORAL at 21:53

## 2020-05-16 RX ADMIN — Medication SCH MG: at 09:06

## 2020-05-16 RX ADMIN — GABAPENTIN SCH MG: 100 CAPSULE ORAL at 09:04

## 2020-05-16 RX ADMIN — HYDROCODONE BITARTRATE AND ACETAMINOPHEN PRN TAB: 10; 325 TABLET ORAL at 17:45

## 2020-05-16 RX ADMIN — NITROGLYCERIN PRN MG: 0.4 TABLET SUBLINGUAL at 17:59

## 2020-05-16 RX ADMIN — NITROGLYCERIN PRN MG: 0.4 TABLET SUBLINGUAL at 17:53

## 2020-05-16 RX ADMIN — HYDROCODONE BITARTRATE AND ACETAMINOPHEN PRN TAB: 10; 325 TABLET ORAL at 23:37

## 2020-05-16 RX ADMIN — Medication SCH TAB: at 09:06

## 2020-05-16 RX ADMIN — Medication SCH MG: at 09:05

## 2020-05-16 RX ADMIN — MELOXICAM SCH MG: 7.5 TABLET ORAL at 09:04

## 2020-05-16 RX ADMIN — DOCUSATE SODIUM SCH MG: 100 CAPSULE, LIQUID FILLED ORAL at 21:53

## 2020-05-16 RX ADMIN — MUPIROCIN SCH APPLIC: 20 OINTMENT TOPICAL at 21:55

## 2020-05-16 NOTE — NUR
Medications

given. Educated the action and side effects of medications. Patient verbalized understanding 
and tolerated well. Patient sitting in bedside chair. Informed patient to call for help, 
when wanting to go back to bed. Patient verbalized understanding. No other needs. Call light 
with the patient. Safety precautions in place.

## 2020-05-16 NOTE — NUR
RN rounds

Patient assisted back to bed. No signs of distress noted. Breathing even and unlabored. IV 
patent and intact, infusing ABX. No other needs. Call light with the patient. Safety 
precautions in place.

## 2020-05-16 NOTE — NUR
Notes- Pt is eating at this time. chest pain is resolved after giving nitro x2. v/s stable. 
BP is 128/74, HR 75.

## 2020-05-16 NOTE — NUR
Nutrition Update



Micheal Scale 16 noted.

Pt admitted for cellulitis. 

Diet: Regular

BMI: 30.0



RD to follow per nutrition care standards.

## 2020-05-16 NOTE — NUR
COVID TEST

Patient is full awake. COVID test was ordered. Prior this test, it was emplaned to the 
patient by Frederick who speaks Togolese. Since patient understood this test, it was done. 
Questionnaires were completed and sent to Lab. with the swab.

## 2020-05-16 NOTE — NUR
Initial notes

In bed, sitting at the edge of the bed eating breakfast, has ace wrap on  both legs as 
ordered. denies any pain or shortness of breath at this time. confused. will monitor.

## 2020-05-16 NOTE — NUR
CLOSING NOTES

Patient resting in bed, awake, breathing evenly. IV catheter intact, patient tolerated it 
well.  No other needs at this time. No BM during this shift and need to collect stool OB. 
COVID test was sent to lab.Needs met throughout the shift. Fall/safety precautions, will 
endorse care to morning shift RN

## 2020-05-16 NOTE — NUR
Spoke to Dr. Santillan

Informed MD that Covid-19 test is negative. MD stated may possibly do EGD/Colonoscopy on 
Monday, 5/18/2020. MD waiting for OBS results. Informed him they have not been collected 
yet, MD verbalized understanding. Will attempt to collect stool sample. Hat placed in 
bedside commode.

## 2020-05-16 NOTE — NUR
Dietitian Recommendations 

*Recommend continuing regular diet 



Please see nutrition assessment for details.



SS, NIRAV

## 2020-05-16 NOTE — NUR
Notes- pt walking in the room and walking for her shoes and wants to wear them Informed pt 
that she does not have a shoe and socks is fine. pt gets agitated a little bit and then calm 
down.

## 2020-05-16 NOTE — NUR
Opening notes

Received report. Patient is resting in bed. No signs of distress noted. Breathing even and  
unlabored. IV patent and intact, no signs of infiltration noted. No needs at this time. Call 
light with the patient. Safety precautions in place.

## 2020-05-17 VITALS — SYSTOLIC BLOOD PRESSURE: 111 MMHG

## 2020-05-17 VITALS — SYSTOLIC BLOOD PRESSURE: 114 MMHG

## 2020-05-17 VITALS — SYSTOLIC BLOOD PRESSURE: 138 MMHG

## 2020-05-17 VITALS — SYSTOLIC BLOOD PRESSURE: 131 MMHG

## 2020-05-17 VITALS — SYSTOLIC BLOOD PRESSURE: 140 MMHG

## 2020-05-17 VITALS — SYSTOLIC BLOOD PRESSURE: 121 MMHG

## 2020-05-17 LAB
ALBUMIN SERPL BCP-MCNC: 3 G/DL (ref 3.4–4.8)
ALT SERPL W P-5'-P-CCNC: 18 U/L (ref 12–78)
ANION GAP SERPL CALCULATED.3IONS-SCNC: 6 MMOL/L (ref 5–15)
AST SERPL W P-5'-P-CCNC: 10 U/L (ref 10–37)
BASOPHILS # BLD AUTO: 0 K/UL (ref 0–0.2)
BASOPHILS NFR BLD AUTO: 0.3 % (ref 0–2)
BILIRUB SERPL-MCNC: 0.3 MG/DL (ref 0–1)
BUN SERPL-MCNC: 26 MG/DL (ref 8–21)
CALCIUM SERPL-MCNC: 8.2 MG/DL (ref 8.4–11)
CHLORIDE SERPL-SCNC: 105 MMOL/L (ref 98–107)
CREAT SERPL-MCNC: 0.93 MG/DL (ref 0.55–1.3)
EOSINOPHIL # BLD AUTO: 0.1 K/UL (ref 0–0.4)
EOSINOPHIL NFR BLD AUTO: 1.5 % (ref 0–4)
ERYTHROCYTE [DISTWIDTH] IN BLOOD BY AUTOMATED COUNT: 15.2 % (ref 9–15)
GFR SERPL CREATININE-BSD FRML MDRD: (no result) ML/MIN (ref 90–?)
GLUCOSE SERPL-MCNC: 72 MG/DL (ref 70–99)
HCT VFR BLD AUTO: 27.1 % (ref 36–48)
HGB BLD-MCNC: 8.9 G/DL (ref 12–16)
LYMPHOCYTES # BLD AUTO: 1.2 K/UL (ref 1–5.5)
LYMPHOCYTES NFR BLD AUTO: 16 % (ref 20.5–51.5)
MCH RBC QN AUTO: 28 PG (ref 27–31)
MCHC RBC AUTO-ENTMCNC: 33 % (ref 32–36)
MCV RBC AUTO: 87 FL (ref 79–98)
MONOCYTES # BLD MANUAL: 0.8 K/UL (ref 0–1)
MONOCYTES # BLD MANUAL: 11 % (ref 1.7–9.3)
NEUTROPHILS # BLD AUTO: 5.4 K/UL (ref 1.8–7.7)
NEUTROPHILS NFR BLD AUTO: 71.2 % (ref 40–70)
PLATELET # BLD AUTO: 259 K/UL (ref 130–430)
POTASSIUM SERPL-SCNC: 4.5 MMOL/L (ref 3.5–5.1)
RBC # BLD AUTO: 3.14 MIL/UL (ref 4.2–6.2)
SODIUM SERPLBLD-SCNC: 140 MMOL/L (ref 136–145)
WBC # BLD AUTO: 7.6 K/UL (ref 4.8–10.8)

## 2020-05-17 RX ADMIN — GABAPENTIN SCH MG: 100 CAPSULE ORAL at 08:14

## 2020-05-17 RX ADMIN — DONEPEZIL HYDROCHLORIDE SCH MG: 5 TABLET, FILM COATED ORAL at 08:10

## 2020-05-17 RX ADMIN — MELOXICAM SCH MG: 7.5 TABLET ORAL at 08:12

## 2020-05-17 RX ADMIN — DOCUSATE SODIUM SCH MG: 100 CAPSULE, LIQUID FILLED ORAL at 21:38

## 2020-05-17 RX ADMIN — Medication SCH TAB: at 08:15

## 2020-05-17 RX ADMIN — MEMANTINE HYDROCHLORIDE SCH MG: 5 TABLET ORAL at 21:38

## 2020-05-17 RX ADMIN — Medication SCH MCG: at 08:15

## 2020-05-17 RX ADMIN — CITALOPRAM SCH MG: 20 TABLET, FILM COATED ORAL at 08:11

## 2020-05-17 RX ADMIN — Medication SCH MLS/HR: at 05:17

## 2020-05-17 RX ADMIN — Medication SCH MG: at 08:13

## 2020-05-17 RX ADMIN — Medication SCH MLS/HR: at 18:35

## 2020-05-17 RX ADMIN — Medication SCH MG: at 08:12

## 2020-05-17 RX ADMIN — GABAPENTIN SCH MG: 100 CAPSULE ORAL at 15:00

## 2020-05-17 RX ADMIN — MUPIROCIN SCH APPLIC: 20 OINTMENT TOPICAL at 08:13

## 2020-05-17 RX ADMIN — GABAPENTIN SCH MG: 100 CAPSULE ORAL at 21:38

## 2020-05-17 RX ADMIN — MUPIROCIN SCH APPLIC: 20 OINTMENT TOPICAL at 21:38

## 2020-05-17 RX ADMIN — DOCUSATE SODIUM SCH MG: 100 CAPSULE, LIQUID FILLED ORAL at 08:14

## 2020-05-17 RX ADMIN — MEMANTINE HYDROCHLORIDE SCH MG: 5 TABLET ORAL at 08:10

## 2020-05-17 NOTE — NUR
RN rounds

Patient sleeping. No signs of distress noted. Breathing even and unlabored. No needs at this 
time. Call light with the patient. Safety precautions in place.

## 2020-05-17 NOTE — NUR
RN rounds

Patient asleep. No signs of distress noted, breathing even and unlabored. No needs at this 
time. Call light with the patient. Safety precautions in place.

## 2020-05-17 NOTE — NUR
Patient is Italian speaking; A/Ox2 is determined thru translation for RN. IV on the left AC, 
#20, SL. POC is explained. Patient walks around the room with steady gait. Call light in 
place, patient is near nursing station, instructed on call light.

## 2020-05-17 NOTE — NUR
Dr. Villalpando at bedside/PRP

Assessed patient. Patient able to be discharged if  hemoglobin is okay. Awaiting lab 
results. Patient can do EGD/Colonoscopy outpatient. Call Dr. Villalpando when bed is available for 
antibiotic orders.

## 2020-05-17 NOTE — NUR
Closing notes

Patient is resting in bed. No signs of distress noted. Breathing even and unlabored. No 
complaints of pain. IV patent and intact, infusing ABX. All needs met throughout the shift. 
Call light with the patient. Safety precautions in place. Will endorse care to day shift RN.

## 2020-05-17 NOTE — NUR
RN ROUNDS

PATIENT AWAKE, SITTING AND TALKING BY HERSELF IN THE ROOM. COOPERATIVE AND CALM. DENIES PAIN 
AND DISCOMFORT. NO RESPIRATORY DISTRESS. NEEDS ARE ATTENDED. SAFETY PRECAUTIONS IN PLACE. 
WILL CONTINUE TO MONITOR PATIENT.

## 2020-05-17 NOTE — NUR
OPENING NOTES

PATIENT AWAKE AOX2. CONFUSION IS NOTED BUT FOLLOW ON COMMANDS. NO SIGNS OF RESPIRATORY 
DISTRESS NOTED. DENIES PAIN AND DISCOMFORT AT THIS TIME. Bengali SPEAKING. FABRIZIO (MONITOR 
TECH)HELP WITH TRANSLATION. PATIENT ON ROOM AIR TOLERATING WELL. IV SITES, PATENCY NOTED. 
CALL LIGHT WITHIN REACH. BED LOCKED AND IN LOWEST POSITION. SAFETY PRECAUTIONS IN PLACE. 
NEEDS ATTENDED. WILL CONTINUE TO MONITOR PATIENT.

## 2020-05-17 NOTE — NUR
RN rounds

Patient incontinent. Complete linen and gown changed. Hygiene care provided by patient. 
Patient tolerated well. No signs of distress noted. Breathing even and unlabored. No other 
needs. Call light with the patient. Safety precautions in place.

## 2020-05-17 NOTE — NUR
MED PASS

DUE MEDICATION GIVEN AT THIS TIME. PATIENT TOLERATED WELL. PATIENT WAS EDUCATED ON 
MEDICATION THAT WAS TAKEN, PATIENT IS CONFUSED AND UNABLE TO VERBALIZED 
UNDERSTANDING.MONITOR TECH-FABRIZIO HELP WITH THE TRANSLATION. CALL LIGHT WITHIN REACH. 
PATIENT CLOSE TO NURSING STATION. PATIENT AMBULATES IN ROOM. WILL CONTINUE TO MONITOR 
PATIENT.

## 2020-05-18 VITALS — SYSTOLIC BLOOD PRESSURE: 137 MMHG

## 2020-05-18 VITALS — SYSTOLIC BLOOD PRESSURE: 109 MMHG

## 2020-05-18 RX ADMIN — Medication SCH MLS/HR: at 05:27

## 2020-05-18 RX ADMIN — DOCUSATE SODIUM SCH MG: 100 CAPSULE, LIQUID FILLED ORAL at 09:02

## 2020-05-18 RX ADMIN — Medication SCH TAB: at 09:03

## 2020-05-18 RX ADMIN — MELOXICAM SCH MG: 7.5 TABLET ORAL at 09:02

## 2020-05-18 RX ADMIN — Medication SCH MG: at 09:03

## 2020-05-18 RX ADMIN — Medication SCH MLS/HR: at 05:54

## 2020-05-18 RX ADMIN — GABAPENTIN SCH MG: 100 CAPSULE ORAL at 09:03

## 2020-05-18 RX ADMIN — DONEPEZIL HYDROCHLORIDE SCH MG: 5 TABLET, FILM COATED ORAL at 09:02

## 2020-05-18 RX ADMIN — CITALOPRAM SCH MG: 20 TABLET, FILM COATED ORAL at 09:03

## 2020-05-18 RX ADMIN — MEMANTINE HYDROCHLORIDE SCH MG: 5 TABLET ORAL at 09:02

## 2020-05-18 RX ADMIN — Medication SCH MCG: at 09:02

## 2020-05-18 RX ADMIN — Medication SCH MG: at 09:04

## 2020-05-18 NOTE — NUR
Opening Notes

Patient is awake, alert and oriented x3. Patient is ambulatory, steady gait. No resp 
distress noted at this time. Breathing is even and unlabored. Patient denies any pain at 
this time. BSC at bedside. IV site on right shoulder, 22 gauge and left FA, 22 gauge. 
Patient was noted with bilateral swelling, educated patient to elevate legs while in bed. 
Still need to obtain a stool sample. Safety and fall precautions in place. Call light within 
reach. Bed in lowest position, locked. Will continue to monitor.

## 2020-05-18 NOTE — NUR
OPENING NOTES

PATIENT AWAKE AOX2. NO SIGNS OF RESPIRATORY DISTRESS NOTED. DENIES PAIN AND DISCOMFORT AT 
THIS TIME. Peruvian SPEAKING. FABRIZIO ([employer])HELP WITH TRANSLATION. PATIENT ON ROOM 
AIR TOLERATING WELL. IV SITES, PATENCY NOTED. CALL LIGHT WITHIN REACH. BED LOCKED AND IN 
LOWEST POSITION. SAFETY PRECAUTIONS IN PLACE. NEEDS ATTENDED. WILL CONTINUE TO MONITOR 
PATIENT.

-------------------------------------------------------------------------------

Addendum: 05/18/20 at 0451 by Kelly Myers RN

-------------------------------------------------------------------------------

ENTERED IN THE WRONG TIME. [facility]

## 2020-05-18 NOTE — NUR
D/C Patient

Patient given medication reconciliation form and D/C instructions. Exit Care provided. 
Patient verbalized understanding. MD discussed with patient the results and treatment 
provided. Ambulatory with steady gait for discharge to home. Patient in stable condition, ID 
band removed. IV catheter removed, intact and dressing applied, no active bleeding. Patient 
noted with agitation prior to departure. Given Ativan 0.5 mg, tolerated well. All belongings 
sent with patient. Gave report to Merlene at [facility].

## 2020-05-18 NOTE — NUR
RN ROUNDS

PATIENT ASLEEP AT THIS TIME. PATIENT HAS NO SIGNS OF RESPIRATORY DISTRESS AND DISCOMFORT 
NOTED. BREATHING EVEN AND UNLABORED. CALL LIGHT WITHIN REACH. SAFETY PRECAUTIONS IN PLACE 
.WILL CONTINUE TO MONITOR PATIENT

## 2020-05-18 NOTE — NUR
CLOSING NOTES

PATIENT ASLEEP AT THIS TIME. NO SIGNS OF RESPIRATORY DISTRESS AND DISCOMFORT NOTED. 
BREATHING EVEN AND UNLABORED. CALL LIGHT WITHIN REACH. SAFETY PRECAUTIONS PLACE. ON CONTACT 
ISOLATION. ALL NEEDS MET THROUGHOUT THE SHIFT. WILL CONTINUE TO MONITOR UNTIL ENDORSE TO 
ONCOMING SHIFT NURSE FOR CONTINUITY OF CARE.

## 2020-09-16 ENCOUNTER — HOSPITAL ENCOUNTER (EMERGENCY)
Dept: HOSPITAL 4 - SED | Age: 80
Discharge: PSYCHIATRIC OR SUBSTANCE USE FACILITY | End: 2020-09-16
Payer: COMMERCIAL

## 2020-09-16 VITALS — SYSTOLIC BLOOD PRESSURE: 148 MMHG

## 2020-09-16 VITALS — WEIGHT: 145 LBS | BODY MASS INDEX: 26.68 KG/M2 | HEIGHT: 62 IN

## 2020-09-16 VITALS — SYSTOLIC BLOOD PRESSURE: 136 MMHG

## 2020-09-16 DIAGNOSIS — J44.9: ICD-10-CM

## 2020-09-16 DIAGNOSIS — N28.9: ICD-10-CM

## 2020-09-16 DIAGNOSIS — N39.0: Primary | ICD-10-CM

## 2020-09-16 DIAGNOSIS — Z79.899: ICD-10-CM

## 2020-09-16 LAB
ALBUMIN SERPL BCP-MCNC: 3.3 G/DL (ref 3.4–4.8)
ALT SERPL W P-5'-P-CCNC: 15 U/L (ref 12–78)
AMPHETAMINES UR QL SCN: NEGATIVE
ANION GAP SERPL CALCULATED.3IONS-SCNC: 6 MMOL/L (ref 5–15)
APAP SERPL-MCNC: < 1 UG/ML (ref 1–30)
APPEARANCE UR: CLEAR
AST SERPL W P-5'-P-CCNC: 16 U/L (ref 10–37)
BACTERIA URNS QL MICRO: (no result) /HPF
BARBITURATES UR QL SCN: NEGATIVE
BASOPHILS # BLD AUTO: 0 K/UL (ref 0–0.2)
BASOPHILS NFR BLD AUTO: 0.4 % (ref 0–2)
BENZODIAZ UR QL SCN: NEGATIVE
BILIRUB SERPL-MCNC: 0.3 MG/DL (ref 0–1)
BILIRUB UR QL STRIP: NEGATIVE
BUN SERPL-MCNC: 28 MG/DL (ref 8–21)
BZE UR QL SCN: NEGATIVE
CALCIUM SERPL-MCNC: 8.5 MG/DL (ref 8.4–11)
CANNABINOIDS UR QL SCN: NEGATIVE
CHLORIDE SERPL-SCNC: 103 MMOL/L (ref 98–107)
CHOLEST SERPL-MCNC: 208 MG/DL (ref ?–200)
COLOR UR: YELLOW
CREAT SERPL-MCNC: 1.14 MG/DL (ref 0.55–1.3)
EOSINOPHIL # BLD AUTO: 0.1 K/UL (ref 0–0.4)
EOSINOPHIL NFR BLD AUTO: 1.4 % (ref 0–4)
ERYTHROCYTE [DISTWIDTH] IN BLOOD BY AUTOMATED COUNT: 14.7 % (ref 9–15)
ETHANOL SERPL-MCNC: < 3 MG/DL (ref ?–10)
GFR SERPL CREATININE-BSD FRML MDRD: (no result) ML/MIN (ref 90–?)
GLUCOSE SERPL-MCNC: 124 MG/DL (ref 70–99)
GLUCOSE UR STRIP-MCNC: NEGATIVE MG/DL
HCT VFR BLD AUTO: 31.9 % (ref 36–48)
HDLC SERPL-MCNC: 84 MG/DL (ref 55–?)
HGB BLD-MCNC: 10.4 G/DL (ref 12–16)
HGB UR QL STRIP: NEGATIVE
KETONES UR STRIP-MCNC: NEGATIVE MG/DL
LDL CHOLESTEROL: 108 MG/DL (ref ?–100)
LEUKOCYTE ESTERASE UR QL STRIP: (no result)
LYMPHOCYTES # BLD AUTO: 1.3 K/UL (ref 1–5.5)
LYMPHOCYTES NFR BLD AUTO: 18.8 % (ref 20.5–51.5)
MCH RBC QN AUTO: 27 PG (ref 27–31)
MCHC RBC AUTO-ENTMCNC: 33 % (ref 32–36)
MCV RBC AUTO: 82 FL (ref 79–98)
METHADONE UR-SCNC: NEGATIVE UMOL/L
METHAMPHET UR-SCNC: NEGATIVE UMOL/L
MONOCYTES # BLD MANUAL: 0.6 K/UL (ref 0–1)
MONOCYTES # BLD MANUAL: 9.1 % (ref 1.7–9.3)
NEUTROPHILS # BLD AUTO: 4.9 K/UL (ref 1.8–7.7)
NEUTROPHILS NFR BLD AUTO: 70.3 % (ref 40–70)
NITRITE UR QL STRIP: NEGATIVE
OPIATES UR QL SCN: NEGATIVE
OXYCODONE SERPL-MCNC: NEGATIVE NG/ML
PCP UR QL SCN: NEGATIVE
PH UR STRIP: 6 [PH] (ref 5–8)
PLATELET # BLD AUTO: 264 K/UL (ref 130–430)
POTASSIUM SERPL-SCNC: 4.4 MMOL/L (ref 3.5–5.1)
PROT UR QL STRIP: NEGATIVE
RBC # BLD AUTO: 3.89 MIL/UL (ref 4.2–6.2)
RBC #/AREA URNS HPF: (no result) /HPF (ref 0–3)
SODIUM SERPLBLD-SCNC: 137 MMOL/L (ref 136–145)
SP GR UR STRIP: 1.01 (ref 1–1.03)
TRICYCLICS UR-MCNC: POSITIVE NG/ML
TRIGL SERPL-MCNC: 87 MG/DL (ref 30–150)
URINE PROPOXYPHENE SCREEN: NEGATIVE
UROBILINOGEN UR STRIP-MCNC: 0.2 MG/DL (ref 0.2–1)
WBC # BLD AUTO: 6.9 K/UL (ref 4.8–10.8)
WBC #/AREA URNS HPF: (no result) /HPF (ref 0–3)

## 2020-09-16 PROCEDURE — 80053 COMPREHEN METABOLIC PANEL: CPT

## 2020-09-16 PROCEDURE — 80307 DRUG TEST PRSMV CHEM ANLYZR: CPT

## 2020-09-16 PROCEDURE — 87426 SARSCOV CORONAVIRUS AG IA: CPT

## 2020-09-16 PROCEDURE — G0482 DRUG TEST DEF 15-21 CLASSES: HCPCS

## 2020-09-16 PROCEDURE — 80061 LIPID PANEL: CPT

## 2020-09-16 PROCEDURE — 36415 COLL VENOUS BLD VENIPUNCTURE: CPT

## 2020-09-16 PROCEDURE — 83036 HEMOGLOBIN GLYCOSYLATED A1C: CPT

## 2020-09-16 PROCEDURE — 87081 CULTURE SCREEN ONLY: CPT

## 2020-09-16 PROCEDURE — 87086 URINE CULTURE/COLONY COUNT: CPT

## 2020-09-16 PROCEDURE — G0480 DRUG TEST DEF 1-7 CLASSES: HCPCS

## 2020-09-16 PROCEDURE — 81000 URINALYSIS NONAUTO W/SCOPE: CPT

## 2020-09-16 PROCEDURE — 99285 EMERGENCY DEPT VISIT HI MDM: CPT

## 2020-09-16 PROCEDURE — G0481 DRUG TEST DEF 8-14 CLASSES: HCPCS

## 2020-09-16 PROCEDURE — 85025 COMPLETE CBC W/AUTO DIFF WBC: CPT

## 2020-09-16 NOTE — NUR
Pt in [facility] with side rail up and in room 2. here for medical clearance. No 
distress noted.

## 2020-09-16 NOTE — NUR
pt resting in [facility]. alert and able to make needs known. No distress noted.

## 2020-09-16 NOTE — NUR
CALLED [facility] TO GET REPORT, REPORT RECEIVED FROM TYLER AT 
[phone]. MAIN NUMBER TO [facility] WAS DISCONNECTED.

## 2021-09-02 ENCOUNTER — HOSPITAL ENCOUNTER (INPATIENT)
Dept: HOSPITAL 4 - SED | Age: 81
LOS: 5 days | Discharge: NURSING, REHAB OR LONG-TERM CARE | DRG: 603 | End: 2021-09-07
Attending: FAMILY MEDICINE | Admitting: FAMILY MEDICINE
Payer: COMMERCIAL

## 2021-09-02 VITALS — SYSTOLIC BLOOD PRESSURE: 126 MMHG | WEIGHT: 191 LBS | BODY MASS INDEX: 31.82 KG/M2 | HEIGHT: 65 IN

## 2021-09-02 DIAGNOSIS — F03.90: ICD-10-CM

## 2021-09-02 DIAGNOSIS — Z20.822: ICD-10-CM

## 2021-09-02 DIAGNOSIS — Z96.611: ICD-10-CM

## 2021-09-02 DIAGNOSIS — I12.9: ICD-10-CM

## 2021-09-02 DIAGNOSIS — Z79.1: ICD-10-CM

## 2021-09-02 DIAGNOSIS — Z79.82: ICD-10-CM

## 2021-09-02 DIAGNOSIS — Z96.653: ICD-10-CM

## 2021-09-02 DIAGNOSIS — I48.91: ICD-10-CM

## 2021-09-02 DIAGNOSIS — Z79.899: ICD-10-CM

## 2021-09-02 DIAGNOSIS — L03.119: Primary | ICD-10-CM

## 2021-09-02 DIAGNOSIS — F20.9: ICD-10-CM

## 2021-09-02 DIAGNOSIS — N18.9: ICD-10-CM

## 2021-09-02 DIAGNOSIS — I25.10: ICD-10-CM

## 2021-09-02 LAB
ALBUMIN SERPL BCP-MCNC: 2.8 G/DL (ref 3.4–4.8)
ALT SERPL W P-5'-P-CCNC: 12 U/L (ref 12–78)
ANION GAP SERPL CALCULATED.3IONS-SCNC: 12 MMOL/L (ref 5–15)
AST SERPL W P-5'-P-CCNC: 10 U/L (ref 10–37)
BASOPHILS # BLD AUTO: 0 K/UL (ref 0–0.2)
BASOPHILS NFR BLD AUTO: 0.3 % (ref 0–2)
BILIRUB SERPL-MCNC: 0.3 MG/DL (ref 0–1)
BUN SERPL-MCNC: 23 MG/DL (ref 8–21)
CALCIUM SERPL-MCNC: 8.9 MG/DL (ref 8.4–11)
CHLORIDE SERPL-SCNC: 102 MMOL/L (ref 98–107)
CREAT SERPL-MCNC: 1.25 MG/DL (ref 0.55–1.3)
EOSINOPHIL # BLD AUTO: 0.3 K/UL (ref 0–0.4)
EOSINOPHIL NFR BLD AUTO: 4.3 % (ref 0–4)
ERYTHROCYTE [DISTWIDTH] IN BLOOD BY AUTOMATED COUNT: 15.1 % (ref 9–15)
GFR SERPL CREATININE-BSD FRML MDRD: (no result) ML/MIN (ref 90–?)
GLUCOSE SERPL-MCNC: 120 MG/DL (ref 70–99)
HCT VFR BLD AUTO: 33 % (ref 36–48)
HGB BLD-MCNC: 10.5 G/DL (ref 12–16)
LYMPHOCYTES # BLD AUTO: 1.3 K/UL (ref 1–5.5)
LYMPHOCYTES NFR BLD AUTO: 17.4 % (ref 20.5–51.5)
MCH RBC QN AUTO: 25 PG (ref 27–31)
MCHC RBC AUTO-ENTMCNC: 32 % (ref 32–36)
MCV RBC AUTO: 79 FL (ref 79–98)
MONOCYTES # BLD MANUAL: 0.7 K/UL (ref 0–1)
MONOCYTES # BLD MANUAL: 8.8 % (ref 1.7–9.3)
NEUTROPHILS # BLD AUTO: 5.2 K/UL (ref 1.8–7.7)
NEUTROPHILS NFR BLD AUTO: 69.2 % (ref 40–70)
PLATELET # BLD AUTO: 338 K/UL (ref 130–430)
POTASSIUM SERPL-SCNC: 3.9 MMOL/L (ref 3.5–5.1)
RBC # BLD AUTO: 4.17 MIL/UL (ref 4.2–6.2)
SODIUM SERPLBLD-SCNC: 139 MMOL/L (ref 136–145)
WBC # BLD AUTO: 7.5 K/UL (ref 4.8–10.8)

## 2021-09-02 PROCEDURE — G0378 HOSPITAL OBSERVATION PER HR: HCPCS

## 2021-09-02 NOTE — NUR
PT WAS BIB AMBULANCE FOR GENERALIZED WEAKNESS AND BILATERAL LOWER EXTREMITY 
SWELLING X 2 WEEKS. PT CAME FROM [facility] WITH A HISTORY OF CAD, 
COPD, GERD, ANEMIA, CHRONIC KIDNEY DISEASE, HTN, SCHIZOPHRENIA, AND DEPRESSION. 
PT RATES PAIN 5/10 ON PAIN SCALE CURRENTLY.

## 2021-09-02 NOTE — NUR
Pt's Son ( Ortiz ) called and wanted to ask if he can visit pt on Tele floor 
once she gets room assignment, Please call him at [phone] to update

## 2021-09-02 NOTE — NUR
Spoke to Mesfin NAVA at [place] regarding pt transportation. He will call back 
with  information.

## 2021-09-03 VITALS — SYSTOLIC BLOOD PRESSURE: 96 MMHG

## 2021-09-03 VITALS — SYSTOLIC BLOOD PRESSURE: 119 MMHG

## 2021-09-03 VITALS — SYSTOLIC BLOOD PRESSURE: 143 MMHG

## 2021-09-03 VITALS — SYSTOLIC BLOOD PRESSURE: 112 MMHG

## 2021-09-03 VITALS — SYSTOLIC BLOOD PRESSURE: 136 MMHG

## 2021-09-03 LAB
ALBUMIN SERPL BCP-MCNC: 2.8 G/DL (ref 3.4–4.8)
ALT SERPL W P-5'-P-CCNC: 9 U/L (ref 12–78)
ANION GAP SERPL CALCULATED.3IONS-SCNC: 9 MMOL/L (ref 5–15)
AST SERPL W P-5'-P-CCNC: 13 U/L (ref 10–37)
BASOPHILS # BLD AUTO: 0 K/UL (ref 0–0.2)
BASOPHILS NFR BLD AUTO: 0.3 % (ref 0–2)
BILIRUB SERPL-MCNC: 0.3 MG/DL (ref 0–1)
BUN SERPL-MCNC: 25 MG/DL (ref 8–21)
CALCIUM SERPL-MCNC: 8.8 MG/DL (ref 8.4–11)
CHLORIDE SERPL-SCNC: 104 MMOL/L (ref 98–107)
CHOLEST SERPL-MCNC: 198 MG/DL (ref ?–200)
CREAT SERPL-MCNC: 1.04 MG/DL (ref 0.55–1.3)
EOSINOPHIL # BLD AUTO: 0.3 K/UL (ref 0–0.4)
EOSINOPHIL NFR BLD AUTO: 3.7 % (ref 0–4)
ERYTHROCYTE [DISTWIDTH] IN BLOOD BY AUTOMATED COUNT: 15.2 % (ref 9–15)
GFR SERPL CREATININE-BSD FRML MDRD: (no result) ML/MIN (ref 90–?)
GLUCOSE SERPL-MCNC: 112 MG/DL (ref 70–99)
HCT VFR BLD AUTO: 31 % (ref 36–48)
HDLC SERPL-MCNC: 53 MG/DL (ref 55–?)
HGB BLD-MCNC: 10 G/DL (ref 12–16)
LDL CHOLESTEROL: 133 MG/DL (ref ?–100)
LYMPHOCYTES # BLD AUTO: 1.1 K/UL (ref 1–5.5)
LYMPHOCYTES NFR BLD AUTO: 11.9 % (ref 20.5–51.5)
MCH RBC QN AUTO: 26 PG (ref 27–31)
MCHC RBC AUTO-ENTMCNC: 32 % (ref 32–36)
MCV RBC AUTO: 80 FL (ref 79–98)
MONOCYTES # BLD MANUAL: 0.8 K/UL (ref 0–1)
MONOCYTES # BLD MANUAL: 8.3 % (ref 1.7–9.3)
NEUTROPHILS # BLD AUTO: 7 K/UL (ref 1.8–7.7)
NEUTROPHILS NFR BLD AUTO: 75.8 % (ref 40–70)
PLATELET # BLD AUTO: 314 K/UL (ref 130–430)
POTASSIUM SERPL-SCNC: 3.9 MMOL/L (ref 3.5–5.1)
RBC # BLD AUTO: 3.88 MIL/UL (ref 4.2–6.2)
SODIUM SERPLBLD-SCNC: 141 MMOL/L (ref 136–145)
TRIGL SERPL-MCNC: 60 MG/DL (ref 30–150)
TSH SERPL DL<=0.05 MIU/L-ACNC: 1.99 UIU/ML (ref 0.36–3.74)
WBC # BLD AUTO: 9.2 K/UL (ref 4.8–10.8)

## 2021-09-03 RX ADMIN — DEXTROSE AND SODIUM CHLORIDE SCH MLS/HR: 5; 450 INJECTION, SOLUTION INTRAVENOUS at 19:13

## 2021-09-03 RX ADMIN — GABAPENTIN SCH MG: 100 CAPSULE ORAL at 22:02

## 2021-09-03 RX ADMIN — MEMANTINE HYDROCHLORIDE SCH MG: 5 TABLET ORAL at 22:03

## 2021-09-03 RX ADMIN — DEXTROSE AND SODIUM CHLORIDE SCH MLS/HR: 5; 450 INJECTION, SOLUTION INTRAVENOUS at 13:30

## 2021-09-03 RX ADMIN — DONEPEZIL HYDROCHLORIDE SCH MG: 5 TABLET, FILM COATED ORAL at 22:03

## 2021-09-03 RX ADMIN — CITALOPRAM SCH MG: 20 TABLET, FILM COATED ORAL at 17:45

## 2021-09-03 NOTE — NUR
Closing note

Resting quietly with eyes closed, no apparent distress.  No c/o pain or discomfort.  IV 
access found pulled out.  Will endorse to oncoming shift RN.  Fall precautions in place.  
Call light within reach.

## 2021-09-03 NOTE — NUR
Late entry due to patient care

0115  Had moderate amount soft yellow bowl movement.  Bed bath given.  Gown, linen changed.  
Tolerated fair.  Able to help turn.

## 2021-09-03 NOTE — NUR
CONSULT



REASON FOR CONSULT: AFIB

PERSON I SPOKE WITH: LETICIA

CONSULTING PHYSICIAN: DR. NOLAN

CONSULTANT PHONE NUMBER: [phone]

ORDERING PHYSICIAN: DR. HARRIS

## 2021-09-03 NOTE — NUR
ADMISSION NOTE

Received patient from ER via gurney. Patient admitted with diagnosis of A-Fib, Failure to 
Thrive. Patient is awake, alert, oriented X 1. Patient oriented to hospital room, call 
light, toileting, pain management and safety-teach back done. Patient informed that writer 
will be primary nurse and that their room number is 103B. Personal belongings checked and 
Belongings List documented. Call light within reach.

## 2021-09-03 NOTE — NUR
Patient will be admitted to care of Dr. Beltran.  Admitted to Tele unit.  Will go 
to room 103B.  Belongings list completed.  Complete and up to date summary 
report printed. SBAR report to be given at bedside with opportunity for 
questions.

## 2021-09-03 NOTE — NUR
Resting quietly with eyes closed, no apparent distress.  Fall precautions in place.  Call 
light within reach.

## 2021-09-03 NOTE — NUR
Pt report received. Pt alert, responsive, and cooperative. PIV Left wrist patent and secure 
with D5 1/2 NS infusing at 50 mL/hr without difficulty. Generalized swelling to BLE. Pt 
denies c/o pain or discomfort and no needs verbalized at this time.

## 2021-09-03 NOTE — NUR
Nutrition Update



Micheal Scale 14 noted.

Pt admitted for atr fibr, FTT.

Diet: N/A

BMI: 31.9 kg/m2



RD to follow per nutrition care standards.

## 2021-09-04 VITALS — SYSTOLIC BLOOD PRESSURE: 111 MMHG

## 2021-09-04 VITALS — SYSTOLIC BLOOD PRESSURE: 135 MMHG

## 2021-09-04 VITALS — SYSTOLIC BLOOD PRESSURE: 136 MMHG

## 2021-09-04 VITALS — SYSTOLIC BLOOD PRESSURE: 143 MMHG

## 2021-09-04 VITALS — SYSTOLIC BLOOD PRESSURE: 140 MMHG

## 2021-09-04 VITALS — SYSTOLIC BLOOD PRESSURE: 134 MMHG

## 2021-09-04 LAB
APPEARANCE UR: CLEAR
BILIRUB UR QL STRIP: NEGATIVE
COLOR UR: YELLOW
GLUCOSE UR STRIP-MCNC: NEGATIVE MG/DL
HGB UR QL STRIP: NEGATIVE
KETONES UR STRIP-MCNC: NEGATIVE MG/DL
LEUKOCYTE ESTERASE UR QL STRIP: NEGATIVE
NITRITE UR QL STRIP: NEGATIVE
PH UR STRIP: 6 [PH] (ref 5–8)
PROT UR QL STRIP: NEGATIVE
SP GR UR STRIP: 1.01 (ref 1–1.03)
UROBILINOGEN UR STRIP-MCNC: 0.2 MG/DL (ref 0.2–1)

## 2021-09-04 RX ADMIN — MEMANTINE HYDROCHLORIDE SCH MG: 5 TABLET ORAL at 21:12

## 2021-09-04 RX ADMIN — DEXTROSE AND SODIUM CHLORIDE SCH MLS/HR: 5; 450 INJECTION, SOLUTION INTRAVENOUS at 09:02

## 2021-09-04 RX ADMIN — NITROGLYCERIN SCH EACH: 0.4 PATCH TRANSDERMAL at 09:04

## 2021-09-04 RX ADMIN — Medication SCH CAP: at 08:58

## 2021-09-04 RX ADMIN — Medication SCH MG: at 08:59

## 2021-09-04 RX ADMIN — GABAPENTIN SCH MG: 100 CAPSULE ORAL at 21:13

## 2021-09-04 RX ADMIN — PANTOPRAZOLE SODIUM SCH MG: 40 TABLET, DELAYED RELEASE ORAL at 09:00

## 2021-09-04 RX ADMIN — DEXTROSE AND SODIUM CHLORIDE SCH MLS/HR: 5; 450 INJECTION, SOLUTION INTRAVENOUS at 21:41

## 2021-09-04 RX ADMIN — MEMANTINE HYDROCHLORIDE SCH MG: 5 TABLET ORAL at 09:00

## 2021-09-04 RX ADMIN — DOCUSATE SODIUM SCH MG: 100 CAPSULE, LIQUID FILLED ORAL at 08:58

## 2021-09-04 RX ADMIN — Medication SCH TAB: at 09:24

## 2021-09-04 RX ADMIN — GABAPENTIN SCH MG: 100 CAPSULE ORAL at 09:00

## 2021-09-04 RX ADMIN — Medication SCH MG: at 08:57

## 2021-09-04 RX ADMIN — DRONEDARONE SCH MG: 400 TABLET, FILM COATED ORAL at 09:31

## 2021-09-04 RX ADMIN — CITALOPRAM SCH MG: 20 TABLET, FILM COATED ORAL at 08:59

## 2021-09-04 RX ADMIN — ASPIRIN SCH MG: 325 TABLET, FILM COATED ORAL at 10:00

## 2021-09-04 RX ADMIN — ACETAMINOPHEN PRN MG: 325 TABLET ORAL at 11:21

## 2021-09-04 RX ADMIN — DONEPEZIL HYDROCHLORIDE SCH MG: 5 TABLET, FILM COATED ORAL at 21:13

## 2021-09-04 RX ADMIN — GABAPENTIN SCH MG: 100 CAPSULE ORAL at 15:10

## 2021-09-04 RX ADMIN — ACETAMINOPHEN PRN MG: 325 TABLET ORAL at 09:17

## 2021-09-04 NOTE — NUR
Received bedside report from rn. Pt sitting up in bed. Pt aox1-2. rr even and unlabored on 
ra. Iv intact with no redness or swelling. call light within reach. bed locked in lowest 
position. bed alarm. will continue to monitor.

## 2021-09-04 NOTE — NUR
Dietitian Recommendations



* Recommend cardiac diet w/ Prosource daily

(supplement provide an additional 60 kcal/day, 15 gm protein/day)

NIRAV DOMINGUEZ



Please refer to Nutrition Assessment for details.

-------------------------------------------------------------------------------

Addendum: 09/04/21 at 1455 by Julissa Bui RD

-------------------------------------------------------------------------------

Amended: Links added.

-------------------------------------------------------------------------------

Addendum: 09/04/21 at 1500 by Julissa Bui RD

-------------------------------------------------------------------------------

CORRECTION:



* Recommend cardiac diet w/ Prosource BID

(supplement provide an additional 120 kcal/day, 30 gm protein/day)

NIRAV DOMINGUEZ

## 2021-09-04 NOTE — NUR
Pt pulled out PIV. No active bleeding noted to site. 24 GA PIV x 3 attempts, successful to 
Right inner wrist, good blood return, easy NS flush. Secured with tap and tegaderm with ACE 
bandage support. D5 1/2 NS infusing to new site at 50 mL/hr.

## 2021-09-04 NOTE — NUR
Pt in bed resting. Pt turned and changed. pt co of positional pain in L leg. comfort 
measures in place for L leg. all other needs meet. Will continue to monitor.

## 2021-09-04 NOTE — NUR
Pt report given to oncoming RN. Pt resting quietly with eyes closed, even and non-labored 
respirations. D5 1/2 NS continues to infuse at 50 mL/hr to PIV Right inner wrist  without 
difficulty. VSS, NAD.

## 2021-09-05 VITALS — SYSTOLIC BLOOD PRESSURE: 101 MMHG

## 2021-09-05 VITALS — SYSTOLIC BLOOD PRESSURE: 122 MMHG

## 2021-09-05 VITALS — SYSTOLIC BLOOD PRESSURE: 100 MMHG

## 2021-09-05 VITALS — SYSTOLIC BLOOD PRESSURE: 119 MMHG

## 2021-09-05 RX ADMIN — Medication SCH MG: at 11:15

## 2021-09-05 RX ADMIN — GABAPENTIN SCH MG: 100 CAPSULE ORAL at 15:00

## 2021-09-05 RX ADMIN — MEMANTINE HYDROCHLORIDE SCH MG: 5 TABLET ORAL at 11:15

## 2021-09-05 RX ADMIN — Medication SCH CAP: at 11:17

## 2021-09-05 RX ADMIN — MEMANTINE HYDROCHLORIDE SCH MG: 5 TABLET ORAL at 19:59

## 2021-09-05 RX ADMIN — DOCUSATE SODIUM SCH MG: 100 CAPSULE, LIQUID FILLED ORAL at 11:12

## 2021-09-05 RX ADMIN — Medication SCH MG: at 11:17

## 2021-09-05 RX ADMIN — PANTOPRAZOLE SODIUM SCH MG: 40 TABLET, DELAYED RELEASE ORAL at 11:16

## 2021-09-05 RX ADMIN — DONEPEZIL HYDROCHLORIDE SCH MG: 5 TABLET, FILM COATED ORAL at 19:59

## 2021-09-05 RX ADMIN — ASPIRIN SCH MG: 325 TABLET, FILM COATED ORAL at 11:18

## 2021-09-05 RX ADMIN — GABAPENTIN SCH MG: 100 CAPSULE ORAL at 11:14

## 2021-09-05 RX ADMIN — NITROGLYCERIN SCH EACH: 0.4 PATCH TRANSDERMAL at 11:19

## 2021-09-05 RX ADMIN — CITALOPRAM SCH MG: 20 TABLET, FILM COATED ORAL at 11:16

## 2021-09-05 RX ADMIN — DRONEDARONE SCH MG: 400 TABLET, FILM COATED ORAL at 11:13

## 2021-09-05 RX ADMIN — GABAPENTIN SCH MG: 100 CAPSULE ORAL at 19:59

## 2021-09-05 RX ADMIN — Medication SCH TAB: at 11:18

## 2021-09-05 NOTE — NUR
Pt in bed sleeping with eyes closed. pt easily awaken. ivf infusing. call light within 
reach. bed alarm on. will continue to monitor.

## 2021-09-06 VITALS — SYSTOLIC BLOOD PRESSURE: 122 MMHG

## 2021-09-06 VITALS — SYSTOLIC BLOOD PRESSURE: 125 MMHG

## 2021-09-06 VITALS — SYSTOLIC BLOOD PRESSURE: 112 MMHG

## 2021-09-06 VITALS — SYSTOLIC BLOOD PRESSURE: 135 MMHG

## 2021-09-06 VITALS — SYSTOLIC BLOOD PRESSURE: 101 MMHG

## 2021-09-06 RX ADMIN — DRONEDARONE SCH MG: 400 TABLET, FILM COATED ORAL at 08:21

## 2021-09-06 RX ADMIN — MEMANTINE HYDROCHLORIDE SCH MG: 5 TABLET ORAL at 20:41

## 2021-09-06 RX ADMIN — Medication SCH TAB: at 08:27

## 2021-09-06 RX ADMIN — DEXTROSE AND SODIUM CHLORIDE SCH MLS/HR: 5; 450 INJECTION, SOLUTION INTRAVENOUS at 20:36

## 2021-09-06 RX ADMIN — Medication SCH MG: at 08:22

## 2021-09-06 RX ADMIN — DOCUSATE SODIUM SCH MG: 100 CAPSULE, LIQUID FILLED ORAL at 08:24

## 2021-09-06 RX ADMIN — ASPIRIN SCH MG: 325 TABLET, FILM COATED ORAL at 08:23

## 2021-09-06 RX ADMIN — PANTOPRAZOLE SODIUM SCH MG: 40 TABLET, DELAYED RELEASE ORAL at 08:27

## 2021-09-06 RX ADMIN — CITALOPRAM SCH MG: 20 TABLET, FILM COATED ORAL at 08:24

## 2021-09-06 RX ADMIN — DONEPEZIL HYDROCHLORIDE SCH MG: 5 TABLET, FILM COATED ORAL at 20:41

## 2021-09-06 RX ADMIN — GABAPENTIN SCH MG: 100 CAPSULE ORAL at 08:26

## 2021-09-06 RX ADMIN — GABAPENTIN SCH MG: 100 CAPSULE ORAL at 15:02

## 2021-09-06 RX ADMIN — GABAPENTIN SCH MG: 100 CAPSULE ORAL at 20:40

## 2021-09-06 RX ADMIN — Medication SCH MG: at 08:24

## 2021-09-06 RX ADMIN — NITROGLYCERIN SCH EACH: 0.4 PATCH TRANSDERMAL at 08:28

## 2021-09-06 RX ADMIN — Medication SCH CAP: at 08:25

## 2021-09-06 RX ADMIN — MEMANTINE HYDROCHLORIDE SCH MG: 5 TABLET ORAL at 08:25

## 2021-09-06 NOTE — NUR
CLOSING NOTES

Patient resting in bed - no s/s pain or distress noted. Respirations even and unlabored - 
head of bed elevated. IV site patent - no s/s redness, infection, or infiltration. Bed 
locked and in lowest position. Call light within reach - bed alarm on.

## 2021-09-07 VITALS — SYSTOLIC BLOOD PRESSURE: 123 MMHG

## 2021-09-07 VITALS — SYSTOLIC BLOOD PRESSURE: 146 MMHG

## 2021-09-07 VITALS — SYSTOLIC BLOOD PRESSURE: 147 MMHG

## 2021-09-07 VITALS — SYSTOLIC BLOOD PRESSURE: 120 MMHG

## 2021-09-07 RX ADMIN — DOCUSATE SODIUM SCH MG: 100 CAPSULE, LIQUID FILLED ORAL at 08:07

## 2021-09-07 RX ADMIN — Medication SCH MG: at 08:08

## 2021-09-07 RX ADMIN — GABAPENTIN SCH MG: 100 CAPSULE ORAL at 08:17

## 2021-09-07 RX ADMIN — GABAPENTIN SCH MG: 100 CAPSULE ORAL at 14:29

## 2021-09-07 RX ADMIN — Medication SCH TAB: at 08:07

## 2021-09-07 RX ADMIN — DRONEDARONE SCH MG: 400 TABLET, FILM COATED ORAL at 08:06

## 2021-09-07 RX ADMIN — PANTOPRAZOLE SODIUM SCH MG: 40 TABLET, DELAYED RELEASE ORAL at 08:07

## 2021-09-07 RX ADMIN — MEMANTINE HYDROCHLORIDE SCH MG: 5 TABLET ORAL at 08:07

## 2021-09-07 RX ADMIN — CITALOPRAM SCH MG: 20 TABLET, FILM COATED ORAL at 08:07

## 2021-09-07 RX ADMIN — NITROGLYCERIN SCH EACH: 0.4 PATCH TRANSDERMAL at 08:07

## 2021-09-07 RX ADMIN — Medication SCH MG: at 08:09

## 2021-09-07 RX ADMIN — Medication SCH CAP: at 08:17

## 2021-09-07 NOTE — NUR
medication

patients scheduled medication given per order. patient is awake and alert. informed she is 
going back home. patient has all safety precautions in place. no signs of any distress, 
breathing is equal and non labored. call light is with her. no other needs at this time.

## 2021-09-07 NOTE — NUR
MEDICATION/ MD

PATIENTS SCHEDULED MEDICATION GIVEN PER ORDER. PATIENT IS AWAKE AND ALERT TOLERATED 
MEDICATION WELL. INFORMED MD OF DVT PROPHYLAXIS STATES SHE WILL ORDER. PATIENT HAS ALL 
SAFETY PRECAUTIONS IN PLACE. CALL LIGHT IS WITH HER EDUCATED TO USE FOR ASSISTANCE. NO OTHER 
NEEDS AT THIS TIME.

## 2021-09-07 NOTE — NUR
DISCHARGE PLANNING

Order to dc SNF. Pt from [facility] & faxed order/pt info to [facility]. Received 
call from Kit at [facility], pt accepted back to room 9B. Called & set up transportation with 
[facility] for 545pm. Called & updated son Doni Steiner, ph [phone], agreeable with dc 
back to [facility] today. Nurse updated. Packet to nsg station.



[facility], room 9B, report ph [phone]

[facility] transportation  at 545pm, ph [phone]

## 2021-09-07 NOTE — NUR
RN OPENING NOTE

 PATIENT APPEARS TO BE RESTING BREATHING IS EQUAL AND NON LABORED. PATIENT HAS ALL SAFETY 
PRECAUTIONS IN PLACE. NO OTHER NEEDS AT THIS TIME.

## 2021-09-07 NOTE — NUR
MEDICATION

PATENTS SCHEDULED MEDICATION GIVEN PER ORDER. PATIENT TOLERATED WELL. PATIENT EDUCATED ON 
CALL LIGHT FOR ASSISTANCE. CALL LIGHT IS WITH HER. PATIENT HAS NO OTHER NEEDS AT THIS TIME. 
NO SIGNS OF ANY DISTRESS, BREATHING IS EQUAL AND NON LABORED.

## 2021-09-07 NOTE — NUR
Discharge Note

Patient was transferred back to [facility] report was to Carie nurse at facility. 
Son Doni Steiner was informed of transferred and ok with it. patients id band removed, place 
new one with name and  only on it. patients IV catheter removed catheter intact, applied 
Guaze and tape to insertion site. patients belongings with patient. transferred by [facility] 
via gurney. Patient informed of transferred by is confused. Discharge paperwork was given to 
ambulance. receiving physician is Dr. Beltran. patient ate dinner prior to leaving. patient 
shows no signs of any distress.

## 2021-09-07 NOTE — NUR
RN ROUNDING

 PATIENT IS AWAKE  AND ALERT EATING LUNCH. PATIENT HAS NO COMPLAINTS AT THIS TIME. ALL 
SAFETY PRECAUTIONS IN PLACE. CALL LIGHT IS WITH HER EDUCATED BUT PATIENT IS CONFUSED. 
PATIENT HAS NO OTHER NEEDS AT THIS TIME.

## 2021-09-07 NOTE — NUR
INCONTINENCE CARE 

PATIENT IS AWAKE AND ALERT, PATIENT PROVIDED WITH INCONTINENCE CARE. PATIENT HAS ALL SAFETY 
PRECAUTIONS IN PLACE. NO OTHER NEEDS AT THIS TIME.

## 2022-12-09 NOTE — NUR
Discharge Planning:

CHRISTIE followed up with Priy[facility] (f [phone] p [phone]) Kit patient will go to [facility] 
[facility], DCP spoke to nurse to make her aware. CHRISTIE arranged transportation on will call with 
[facility] ([phone]). NATHALIEP patient packet to nurse station. She would be unlikely to develop a new blood clot if taking Eliquis 5mg twice daily on a consistent basis. If her symptoms continue, would recommend appt for re-evaluation to make sure there is no other issue (musculoskeletal, etc).   Ok for NP if needed CG

## 2023-01-18 NOTE — NUR
CLOSING NOTES

Still asleep in bed. No sign of distress. Midline on right upper arm intact. All needs met 
throughout shift. Fall and safety checks done. Call light within reach. Will endorse to 
night nurse. Partial Purse String (Simple) Text: Given the location of the defect and the characteristics of the surrounding skin a simple purse string closure was deemed most appropriate.  Undermining was performed circumfirentially around the surgical defect.  A purse string suture was then placed and tightened. Wound tension only allowed a partial closure of the circular defect.